# Patient Record
Sex: MALE | Race: WHITE | NOT HISPANIC OR LATINO | Employment: FULL TIME
[De-identification: names, ages, dates, MRNs, and addresses within clinical notes are randomized per-mention and may not be internally consistent; named-entity substitution may affect disease eponyms.]

---

## 2017-04-15 ENCOUNTER — RECORDS - HEALTHEAST (OUTPATIENT)
Dept: ADMINISTRATIVE | Facility: OTHER | Age: 27
End: 2017-04-15

## 2017-04-18 ENCOUNTER — OFFICE VISIT - HEALTHEAST (OUTPATIENT)
Dept: FAMILY MEDICINE | Facility: CLINIC | Age: 27
End: 2017-04-18

## 2017-04-18 ENCOUNTER — COMMUNICATION - HEALTHEAST (OUTPATIENT)
Dept: TELEHEALTH | Facility: CLINIC | Age: 27
End: 2017-04-18

## 2017-04-18 DIAGNOSIS — S39.012A BACK STRAIN: ICD-10-CM

## 2017-04-18 DIAGNOSIS — K21.9 ESOPHAGEAL REFLUX: ICD-10-CM

## 2017-04-18 DIAGNOSIS — Z72.0 TOBACCO USER: ICD-10-CM

## 2017-04-18 DIAGNOSIS — R94.31 ABNORMAL EKG: ICD-10-CM

## 2017-04-18 DIAGNOSIS — R07.89 ATYPICAL CHEST PAIN: ICD-10-CM

## 2017-04-19 LAB
ATRIAL RATE - MUSE: 92 BPM
CHOLEST SERPL-MCNC: 249 MG/DL
DIASTOLIC BLOOD PRESSURE - MUSE: NORMAL MMHG
FASTING STATUS PATIENT QL REPORTED: NO
HDLC SERPL-MCNC: 57 MG/DL
INTERPRETATION ECG - MUSE: NORMAL
LDLC SERPL CALC-MCNC: 168 MG/DL
P AXIS - MUSE: 79 DEGREES
PR INTERVAL - MUSE: 128 MS
QRS DURATION - MUSE: 84 MS
QT - MUSE: 342 MS
QTC - MUSE: 422 MS
R AXIS - MUSE: 54 DEGREES
SYSTOLIC BLOOD PRESSURE - MUSE: NORMAL MMHG
T AXIS - MUSE: 54 DEGREES
TRIGL SERPL-MCNC: 118 MG/DL
VENTRICULAR RATE- MUSE: 92 BPM

## 2017-04-20 ENCOUNTER — COMMUNICATION - HEALTHEAST (OUTPATIENT)
Dept: FAMILY MEDICINE | Facility: CLINIC | Age: 27
End: 2017-04-20

## 2017-04-21 ENCOUNTER — COMMUNICATION - HEALTHEAST (OUTPATIENT)
Dept: FAMILY MEDICINE | Facility: CLINIC | Age: 27
End: 2017-04-21

## 2017-05-12 ENCOUNTER — OFFICE VISIT - HEALTHEAST (OUTPATIENT)
Dept: FAMILY MEDICINE | Facility: CLINIC | Age: 27
End: 2017-05-12

## 2017-05-12 DIAGNOSIS — K21.9 GASTROESOPHAGEAL REFLUX DISEASE WITHOUT ESOPHAGITIS: ICD-10-CM

## 2017-05-12 DIAGNOSIS — E78.01 FAMILIAL HYPERCHOLESTEROLEMIA: ICD-10-CM

## 2017-05-12 DIAGNOSIS — R73.9 HYPERGLYCEMIA: ICD-10-CM

## 2017-05-12 DIAGNOSIS — Z72.0 TOBACCO USER: ICD-10-CM

## 2017-05-12 ASSESSMENT — MIFFLIN-ST. JEOR: SCORE: 1642.53

## 2017-05-24 ENCOUNTER — RECORDS - HEALTHEAST (OUTPATIENT)
Dept: ADMINISTRATIVE | Facility: OTHER | Age: 27
End: 2017-05-24

## 2018-02-26 ENCOUNTER — OFFICE VISIT - HEALTHEAST (OUTPATIENT)
Dept: FAMILY MEDICINE | Facility: CLINIC | Age: 28
End: 2018-02-26

## 2018-02-26 DIAGNOSIS — R82.998 SWEET URINE ODOR: ICD-10-CM

## 2018-02-26 DIAGNOSIS — Z00.00 ANNUAL PHYSICAL EXAM: ICD-10-CM

## 2018-02-26 DIAGNOSIS — R73.9 HYPERGLYCEMIA: ICD-10-CM

## 2018-02-26 DIAGNOSIS — E78.01 FAMILIAL HYPERCHOLESTEROLEMIA: ICD-10-CM

## 2018-02-26 LAB
ALBUMIN SERPL-MCNC: 3.9 G/DL (ref 3.5–5)
ALBUMIN UR-MCNC: NEGATIVE MG/DL
ALP SERPL-CCNC: 47 U/L (ref 45–120)
ALT SERPL W P-5'-P-CCNC: 31 U/L (ref 0–45)
APPEARANCE UR: CLEAR
AST SERPL W P-5'-P-CCNC: 15 U/L (ref 0–40)
BILIRUB DIRECT SERPL-MCNC: 0.2 MG/DL
BILIRUB SERPL-MCNC: 0.6 MG/DL (ref 0–1)
BILIRUB UR QL STRIP: NEGATIVE
CHOLEST SERPL-MCNC: 153 MG/DL
COLOR UR AUTO: YELLOW
FASTING STATUS PATIENT QL REPORTED: YES
GLUCOSE UR STRIP-MCNC: NEGATIVE MG/DL
HBA1C MFR BLD: 5.3 % (ref 3.5–6)
HDLC SERPL-MCNC: 33 MG/DL
HGB UR QL STRIP: NEGATIVE
KETONES UR STRIP-MCNC: NEGATIVE MG/DL
LDLC SERPL CALC-MCNC: 94 MG/DL
LEUKOCYTE ESTERASE UR QL STRIP: NEGATIVE
NITRATE UR QL: NEGATIVE
PH UR STRIP: 7 [PH] (ref 5–8)
PROT SERPL-MCNC: 6.8 G/DL (ref 6–8)
SP GR UR STRIP: 1.02 (ref 1–1.03)
TRIGL SERPL-MCNC: 130 MG/DL
UROBILINOGEN UR STRIP-ACNC: NORMAL

## 2018-02-26 ASSESSMENT — MIFFLIN-ST. JEOR: SCORE: 1613.5

## 2018-04-10 ENCOUNTER — OFFICE VISIT - HEALTHEAST (OUTPATIENT)
Dept: FAMILY MEDICINE | Facility: CLINIC | Age: 28
End: 2018-04-10

## 2018-04-10 DIAGNOSIS — R33.9 URINARY RETENTION: ICD-10-CM

## 2018-04-10 DIAGNOSIS — E78.01 FAMILIAL HYPERCHOLESTEROLEMIA: ICD-10-CM

## 2018-04-10 DIAGNOSIS — Z87.442 HISTORY OF NEPHROLITHIASIS: ICD-10-CM

## 2018-04-10 DIAGNOSIS — R39.15 URINARY URGENCY: ICD-10-CM

## 2018-04-10 LAB
ALBUMIN UR-MCNC: NEGATIVE MG/DL
APPEARANCE UR: CLEAR
BILIRUB UR QL STRIP: NEGATIVE
COLOR UR AUTO: YELLOW
GLUCOSE UR STRIP-MCNC: NEGATIVE MG/DL
HGB UR QL STRIP: ABNORMAL
KETONES UR STRIP-MCNC: NEGATIVE MG/DL
LEUKOCYTE ESTERASE UR QL STRIP: NEGATIVE
NITRATE UR QL: NEGATIVE
PH UR STRIP: 6 [PH] (ref 5–8)
SP GR UR STRIP: >=1.03 (ref 1–1.03)
UROBILINOGEN UR STRIP-ACNC: ABNORMAL

## 2018-06-20 ENCOUNTER — COMMUNICATION - HEALTHEAST (OUTPATIENT)
Dept: FAMILY MEDICINE | Facility: CLINIC | Age: 28
End: 2018-06-20

## 2018-10-15 ENCOUNTER — RECORDS - HEALTHEAST (OUTPATIENT)
Dept: ADMINISTRATIVE | Facility: OTHER | Age: 28
End: 2018-10-15

## 2019-07-23 ENCOUNTER — OFFICE VISIT - HEALTHEAST (OUTPATIENT)
Dept: FAMILY MEDICINE | Facility: CLINIC | Age: 29
End: 2019-07-23

## 2019-07-23 DIAGNOSIS — D48.5 NEOPLASM OF UNCERTAIN BEHAVIOR OF SKIN: ICD-10-CM

## 2019-07-23 DIAGNOSIS — R13.14 PHARYNGOESOPHAGEAL DYSPHAGIA: ICD-10-CM

## 2019-07-23 DIAGNOSIS — F41.9 ANXIETY: ICD-10-CM

## 2019-07-23 DIAGNOSIS — K21.9 GASTROESOPHAGEAL REFLUX DISEASE WITHOUT ESOPHAGITIS: ICD-10-CM

## 2019-07-23 DIAGNOSIS — Z00.00 ANNUAL PHYSICAL EXAM: ICD-10-CM

## 2019-07-23 DIAGNOSIS — E78.01 FAMILIAL HYPERCHOLESTEROLEMIA: ICD-10-CM

## 2019-07-23 LAB
ALBUMIN SERPL-MCNC: 4.6 G/DL (ref 3.5–5)
ALP SERPL-CCNC: 50 U/L (ref 45–120)
ALT SERPL W P-5'-P-CCNC: 27 U/L (ref 0–45)
ANION GAP SERPL CALCULATED.3IONS-SCNC: 6 MMOL/L (ref 5–18)
AST SERPL W P-5'-P-CCNC: 14 U/L (ref 0–40)
BILIRUB SERPL-MCNC: 0.9 MG/DL (ref 0–1)
BUN SERPL-MCNC: 12 MG/DL (ref 8–22)
CALCIUM SERPL-MCNC: 9.7 MG/DL (ref 8.5–10.5)
CHLORIDE BLD-SCNC: 102 MMOL/L (ref 98–107)
CHOLEST SERPL-MCNC: 208 MG/DL
CO2 SERPL-SCNC: 29 MMOL/L (ref 22–31)
CREAT SERPL-MCNC: 0.99 MG/DL (ref 0.7–1.3)
FASTING STATUS PATIENT QL REPORTED: YES
GFR SERPL CREATININE-BSD FRML MDRD: >60 ML/MIN/1.73M2
GLUCOSE BLD-MCNC: 98 MG/DL (ref 70–125)
HDLC SERPL-MCNC: 39 MG/DL
LDLC SERPL CALC-MCNC: 149 MG/DL
POTASSIUM BLD-SCNC: 4.2 MMOL/L (ref 3.5–5)
PROT SERPL-MCNC: 7.1 G/DL (ref 6–8)
SODIUM SERPL-SCNC: 137 MMOL/L (ref 136–145)
TRIGL SERPL-MCNC: 99 MG/DL

## 2019-07-23 ASSESSMENT — MIFFLIN-ST. JEOR: SCORE: 1712.72

## 2019-07-25 ENCOUNTER — COMMUNICATION - HEALTHEAST (OUTPATIENT)
Dept: FAMILY MEDICINE | Facility: CLINIC | Age: 29
End: 2019-07-25

## 2019-09-03 ENCOUNTER — RECORDS - HEALTHEAST (OUTPATIENT)
Dept: ADMINISTRATIVE | Facility: OTHER | Age: 29
End: 2019-09-03

## 2019-11-13 ENCOUNTER — RECORDS - HEALTHEAST (OUTPATIENT)
Dept: ADMINISTRATIVE | Facility: OTHER | Age: 29
End: 2019-11-13

## 2019-11-15 ENCOUNTER — RECORDS - HEALTHEAST (OUTPATIENT)
Dept: ADMINISTRATIVE | Facility: OTHER | Age: 29
End: 2019-11-15

## 2020-02-11 ENCOUNTER — OFFICE VISIT - HEALTHEAST (OUTPATIENT)
Dept: FAMILY MEDICINE | Facility: CLINIC | Age: 30
End: 2020-02-11

## 2020-02-11 DIAGNOSIS — L85.8 KERATOSIS PILARIS: ICD-10-CM

## 2020-02-11 DIAGNOSIS — E78.01 FAMILIAL HYPERCHOLESTEROLEMIA: ICD-10-CM

## 2020-02-11 DIAGNOSIS — K20.0 EOSINOPHILIC ESOPHAGITIS: ICD-10-CM

## 2020-02-11 DIAGNOSIS — N61.0 MASTITIS, RIGHT, ACUTE: ICD-10-CM

## 2020-03-02 ENCOUNTER — COMMUNICATION - HEALTHEAST (OUTPATIENT)
Dept: FAMILY MEDICINE | Facility: CLINIC | Age: 30
End: 2020-03-02

## 2020-03-02 ENCOUNTER — OFFICE VISIT - HEALTHEAST (OUTPATIENT)
Dept: FAMILY MEDICINE | Facility: CLINIC | Age: 30
End: 2020-03-02

## 2020-03-02 DIAGNOSIS — L02.92 BOIL: ICD-10-CM

## 2020-03-03 ENCOUNTER — RECORDS - HEALTHEAST (OUTPATIENT)
Dept: ADMINISTRATIVE | Facility: OTHER | Age: 30
End: 2020-03-03

## 2020-03-05 ENCOUNTER — COMMUNICATION - HEALTHEAST (OUTPATIENT)
Dept: FAMILY MEDICINE | Facility: CLINIC | Age: 30
End: 2020-03-05

## 2020-03-05 DIAGNOSIS — L02.92 BOIL: ICD-10-CM

## 2020-03-05 LAB — BACTERIA SPEC CULT: ABNORMAL

## 2020-03-17 ENCOUNTER — OFFICE VISIT - HEALTHEAST (OUTPATIENT)
Dept: FAMILY MEDICINE | Facility: CLINIC | Age: 30
End: 2020-03-17

## 2020-03-17 DIAGNOSIS — L02.92 BOIL: ICD-10-CM

## 2020-04-02 ENCOUNTER — RECORDS - HEALTHEAST (OUTPATIENT)
Dept: ADMINISTRATIVE | Facility: OTHER | Age: 30
End: 2020-04-02

## 2020-06-11 ENCOUNTER — OFFICE VISIT - HEALTHEAST (OUTPATIENT)
Dept: FAMILY MEDICINE | Facility: CLINIC | Age: 30
End: 2020-06-11

## 2020-06-11 DIAGNOSIS — F41.9 ANXIETY: ICD-10-CM

## 2020-06-11 DIAGNOSIS — F32.9 REACTIVE DEPRESSION: ICD-10-CM

## 2020-06-11 RX ORDER — HYDROXYZINE HYDROCHLORIDE 25 MG/1
25 TABLET, FILM COATED ORAL EVERY 4 HOURS PRN
Qty: 30 TABLET | Refills: 2 | Status: SHIPPED | OUTPATIENT
Start: 2020-06-11 | End: 2022-03-18

## 2020-06-11 ASSESSMENT — ANXIETY QUESTIONNAIRES
7. FEELING AFRAID AS IF SOMETHING AWFUL MIGHT HAPPEN: MORE THAN HALF THE DAYS
IF YOU CHECKED OFF ANY PROBLEMS ON THIS QUESTIONNAIRE, HOW DIFFICULT HAVE THESE PROBLEMS MADE IT FOR YOU TO DO YOUR WORK, TAKE CARE OF THINGS AT HOME, OR GET ALONG WITH OTHER PEOPLE: VERY DIFFICULT
3. WORRYING TOO MUCH ABOUT DIFFERENT THINGS: NEARLY EVERY DAY
2. NOT BEING ABLE TO STOP OR CONTROL WORRYING: MORE THAN HALF THE DAYS
4. TROUBLE RELAXING: MORE THAN HALF THE DAYS
3. WORRYING TOO MUCH ABOUT DIFFERENT THINGS: NEARLY EVERY DAY
6. BECOMING EASILY ANNOYED OR IRRITABLE: MORE THAN HALF THE DAYS
6. BECOMING EASILY ANNOYED OR IRRITABLE: MORE THAN HALF THE DAYS
1. FEELING NERVOUS, ANXIOUS, OR ON EDGE: MORE THAN HALF THE DAYS
4. TROUBLE RELAXING: MORE THAN HALF THE DAYS
5. BEING SO RESTLESS THAT IT IS HARD TO SIT STILL: SEVERAL DAYS
7. FEELING AFRAID AS IF SOMETHING AWFUL MIGHT HAPPEN: MORE THAN HALF THE DAYS
1. FEELING NERVOUS, ANXIOUS, OR ON EDGE: MORE THAN HALF THE DAYS
GAD7 TOTAL SCORE: 14
5. BEING SO RESTLESS THAT IT IS HARD TO SIT STILL: SEVERAL DAYS
GAD7 TOTAL SCORE: 14
2. NOT BEING ABLE TO STOP OR CONTROL WORRYING: MORE THAN HALF THE DAYS

## 2020-06-11 ASSESSMENT — PATIENT HEALTH QUESTIONNAIRE - PHQ9: SUM OF ALL RESPONSES TO PHQ QUESTIONS 1-9: 13

## 2020-06-24 ENCOUNTER — OFFICE VISIT - HEALTHEAST (OUTPATIENT)
Dept: BEHAVIORAL HEALTH | Facility: CLINIC | Age: 30
End: 2020-06-24

## 2020-06-24 DIAGNOSIS — F41.9 ANXIETY DISORDER, UNSPECIFIED TYPE: ICD-10-CM

## 2020-06-30 ENCOUNTER — OFFICE VISIT - HEALTHEAST (OUTPATIENT)
Dept: BEHAVIORAL HEALTH | Facility: CLINIC | Age: 30
End: 2020-06-30

## 2020-06-30 DIAGNOSIS — F41.1 GENERALIZED ANXIETY DISORDER: ICD-10-CM

## 2020-07-17 ENCOUNTER — OFFICE VISIT - HEALTHEAST (OUTPATIENT)
Dept: BEHAVIORAL HEALTH | Facility: CLINIC | Age: 30
End: 2020-07-17

## 2020-07-17 DIAGNOSIS — F41.1 GENERALIZED ANXIETY DISORDER: ICD-10-CM

## 2020-07-28 ENCOUNTER — COMMUNICATION - HEALTHEAST (OUTPATIENT)
Dept: BEHAVIORAL HEALTH | Facility: CLINIC | Age: 30
End: 2020-07-28

## 2020-08-06 ENCOUNTER — OFFICE VISIT - HEALTHEAST (OUTPATIENT)
Dept: BEHAVIORAL HEALTH | Facility: CLINIC | Age: 30
End: 2020-08-06

## 2020-08-06 DIAGNOSIS — F41.1 GENERALIZED ANXIETY DISORDER: ICD-10-CM

## 2020-08-11 ENCOUNTER — OFFICE VISIT - HEALTHEAST (OUTPATIENT)
Dept: BEHAVIORAL HEALTH | Facility: CLINIC | Age: 30
End: 2020-08-11

## 2020-08-11 DIAGNOSIS — F41.1 GENERALIZED ANXIETY DISORDER: ICD-10-CM

## 2020-08-12 ENCOUNTER — AMBULATORY - HEALTHEAST (OUTPATIENT)
Dept: BEHAVIORAL HEALTH | Facility: CLINIC | Age: 30
End: 2020-08-12

## 2020-08-20 ENCOUNTER — OFFICE VISIT - HEALTHEAST (OUTPATIENT)
Dept: BEHAVIORAL HEALTH | Facility: CLINIC | Age: 30
End: 2020-08-20

## 2020-08-20 DIAGNOSIS — F41.1 GENERALIZED ANXIETY DISORDER: ICD-10-CM

## 2020-09-03 ENCOUNTER — OFFICE VISIT - HEALTHEAST (OUTPATIENT)
Dept: BEHAVIORAL HEALTH | Facility: CLINIC | Age: 30
End: 2020-09-03

## 2020-09-03 DIAGNOSIS — F41.1 GENERALIZED ANXIETY DISORDER: ICD-10-CM

## 2020-09-14 ENCOUNTER — OFFICE VISIT - HEALTHEAST (OUTPATIENT)
Dept: BEHAVIORAL HEALTH | Facility: CLINIC | Age: 30
End: 2020-09-14

## 2020-09-14 DIAGNOSIS — F41.1 GENERALIZED ANXIETY DISORDER: ICD-10-CM

## 2020-09-28 ENCOUNTER — OFFICE VISIT - HEALTHEAST (OUTPATIENT)
Dept: BEHAVIORAL HEALTH | Facility: CLINIC | Age: 30
End: 2020-09-28

## 2020-09-28 DIAGNOSIS — F41.1 GENERALIZED ANXIETY DISORDER: ICD-10-CM

## 2020-10-12 ENCOUNTER — OFFICE VISIT - HEALTHEAST (OUTPATIENT)
Dept: BEHAVIORAL HEALTH | Facility: CLINIC | Age: 30
End: 2020-10-12

## 2020-10-12 DIAGNOSIS — F41.1 GENERALIZED ANXIETY DISORDER: ICD-10-CM

## 2020-11-09 ENCOUNTER — OFFICE VISIT - HEALTHEAST (OUTPATIENT)
Dept: BEHAVIORAL HEALTH | Facility: CLINIC | Age: 30
End: 2020-11-09

## 2020-11-09 DIAGNOSIS — F41.1 GENERALIZED ANXIETY DISORDER: ICD-10-CM

## 2020-11-23 ENCOUNTER — OFFICE VISIT - HEALTHEAST (OUTPATIENT)
Dept: BEHAVIORAL HEALTH | Facility: CLINIC | Age: 30
End: 2020-11-23

## 2020-11-23 DIAGNOSIS — F41.1 GENERALIZED ANXIETY DISORDER: ICD-10-CM

## 2020-12-23 ENCOUNTER — RECORDS - HEALTHEAST (OUTPATIENT)
Dept: ADMINISTRATIVE | Facility: OTHER | Age: 30
End: 2020-12-23

## 2021-01-07 ENCOUNTER — RECORDS - HEALTHEAST (OUTPATIENT)
Dept: ADMINISTRATIVE | Facility: OTHER | Age: 31
End: 2021-01-07

## 2021-02-01 ENCOUNTER — COMMUNICATION - HEALTHEAST (OUTPATIENT)
Dept: FAMILY MEDICINE | Facility: CLINIC | Age: 31
End: 2021-02-01

## 2021-02-01 ENCOUNTER — OFFICE VISIT - HEALTHEAST (OUTPATIENT)
Dept: FAMILY MEDICINE | Facility: CLINIC | Age: 31
End: 2021-02-01

## 2021-02-01 DIAGNOSIS — L20.84 INTRINSIC ECZEMA: ICD-10-CM

## 2021-02-01 DIAGNOSIS — F41.1 GAD (GENERALIZED ANXIETY DISORDER): ICD-10-CM

## 2021-02-01 DIAGNOSIS — J30.89 ENVIRONMENTAL AND SEASONAL ALLERGIES: ICD-10-CM

## 2021-02-01 ASSESSMENT — ANXIETY QUESTIONNAIRES
1. FEELING NERVOUS, ANXIOUS, OR ON EDGE: MORE THAN HALF THE DAYS
IF YOU CHECKED OFF ANY PROBLEMS ON THIS QUESTIONNAIRE, HOW DIFFICULT HAVE THESE PROBLEMS MADE IT FOR YOU TO DO YOUR WORK, TAKE CARE OF THINGS AT HOME, OR GET ALONG WITH OTHER PEOPLE: NOT DIFFICULT AT ALL
5. BEING SO RESTLESS THAT IT IS HARD TO SIT STILL: NOT AT ALL
2. NOT BEING ABLE TO STOP OR CONTROL WORRYING: SEVERAL DAYS
3. WORRYING TOO MUCH ABOUT DIFFERENT THINGS: MORE THAN HALF THE DAYS
GAD7 TOTAL SCORE: 5
7. FEELING AFRAID AS IF SOMETHING AWFUL MIGHT HAPPEN: NOT AT ALL
4. TROUBLE RELAXING: NOT AT ALL
6. BECOMING EASILY ANNOYED OR IRRITABLE: NOT AT ALL

## 2021-02-01 ASSESSMENT — PATIENT HEALTH QUESTIONNAIRE - PHQ9: SUM OF ALL RESPONSES TO PHQ QUESTIONS 1-9: 2

## 2021-02-22 ENCOUNTER — AMBULATORY - HEALTHEAST (OUTPATIENT)
Dept: BEHAVIORAL HEALTH | Facility: CLINIC | Age: 31
End: 2021-02-22

## 2021-02-22 ENCOUNTER — OFFICE VISIT - HEALTHEAST (OUTPATIENT)
Dept: BEHAVIORAL HEALTH | Facility: CLINIC | Age: 31
End: 2021-02-22

## 2021-02-22 DIAGNOSIS — F41.1 GENERALIZED ANXIETY DISORDER: ICD-10-CM

## 2021-02-22 ASSESSMENT — PATIENT HEALTH QUESTIONNAIRE - PHQ9: SUM OF ALL RESPONSES TO PHQ QUESTIONS 1-9: 6

## 2021-02-22 ASSESSMENT — ANXIETY QUESTIONNAIRES
1. FEELING NERVOUS, ANXIOUS, OR ON EDGE: SEVERAL DAYS
GAD7 TOTAL SCORE: 8
IF YOU CHECKED OFF ANY PROBLEMS ON THIS QUESTIONNAIRE, HOW DIFFICULT HAVE THESE PROBLEMS MADE IT FOR YOU TO DO YOUR WORK, TAKE CARE OF THINGS AT HOME, OR GET ALONG WITH OTHER PEOPLE: SOMEWHAT DIFFICULT
2. NOT BEING ABLE TO STOP OR CONTROL WORRYING: SEVERAL DAYS
7. FEELING AFRAID AS IF SOMETHING AWFUL MIGHT HAPPEN: SEVERAL DAYS
3. WORRYING TOO MUCH ABOUT DIFFERENT THINGS: MORE THAN HALF THE DAYS
6. BECOMING EASILY ANNOYED OR IRRITABLE: NEARLY EVERY DAY
4. TROUBLE RELAXING: NOT AT ALL
5. BEING SO RESTLESS THAT IT IS HARD TO SIT STILL: NOT AT ALL

## 2021-02-23 ENCOUNTER — OFFICE VISIT - HEALTHEAST (OUTPATIENT)
Dept: ALLERGY | Facility: CLINIC | Age: 31
End: 2021-02-23

## 2021-02-23 ENCOUNTER — SURGERY - HEALTHEAST (OUTPATIENT)
Dept: ALLERGY | Facility: CLINIC | Age: 31
End: 2021-02-23

## 2021-02-23 ENCOUNTER — RECORDS - HEALTHEAST (OUTPATIENT)
Dept: ADMINISTRATIVE | Facility: OTHER | Age: 31
End: 2021-02-23

## 2021-02-23 DIAGNOSIS — K20.0 EOSINOPHILIC ESOPHAGITIS: ICD-10-CM

## 2021-02-23 DIAGNOSIS — J30.89 ALLERGIC RHINITIS DUE TO DUST MITE: ICD-10-CM

## 2021-02-23 DIAGNOSIS — T78.40XD ALLERGIC REACTION, SUBSEQUENT ENCOUNTER: ICD-10-CM

## 2021-02-23 DIAGNOSIS — L30.8 OTHER ECZEMA: ICD-10-CM

## 2021-02-23 DIAGNOSIS — J30.81 ALLERGIC RHINITIS DUE TO CATS: ICD-10-CM

## 2021-02-23 ASSESSMENT — MIFFLIN-ST. JEOR: SCORE: 1722.36

## 2021-02-24 LAB
EGG WHITE IGE QN: 1.16 KU/L
SHRIMP IGE QN: <0.35 KU/L
SOYBEAN IGE QN: <0.35 KU/L
TOTAL IGE - HISTORICAL: 252 KU/L (ref 0–100)
WHEAT IGE QN: 0.57 KU/L

## 2021-02-25 ENCOUNTER — COMMUNICATION - HEALTHEAST (OUTPATIENT)
Dept: ALLERGY | Facility: CLINIC | Age: 31
End: 2021-02-25

## 2021-02-25 LAB
AVOCADO IGE QN: 0.43 KU/L
DEPRECATED MISC ALLERGEN IGE RAST QL: NORMAL

## 2021-02-26 ENCOUNTER — COMMUNICATION - HEALTHEAST (OUTPATIENT)
Dept: ALLERGY | Facility: CLINIC | Age: 31
End: 2021-02-26

## 2021-02-26 ENCOUNTER — COMMUNICATION - HEALTHEAST (OUTPATIENT)
Dept: ENDOCRINOLOGY | Facility: CLINIC | Age: 31
End: 2021-02-26

## 2021-03-22 ENCOUNTER — OFFICE VISIT - HEALTHEAST (OUTPATIENT)
Dept: BEHAVIORAL HEALTH | Facility: CLINIC | Age: 31
End: 2021-03-22

## 2021-03-22 DIAGNOSIS — F41.1 GENERALIZED ANXIETY DISORDER: ICD-10-CM

## 2021-04-13 ENCOUNTER — OFFICE VISIT - HEALTHEAST (OUTPATIENT)
Dept: BEHAVIORAL HEALTH | Facility: CLINIC | Age: 31
End: 2021-04-13

## 2021-04-13 DIAGNOSIS — F41.1 GENERALIZED ANXIETY DISORDER: ICD-10-CM

## 2021-05-07 ENCOUNTER — AMBULATORY - HEALTHEAST (OUTPATIENT)
Dept: NURSING | Facility: CLINIC | Age: 31
End: 2021-05-07

## 2021-05-27 ASSESSMENT — PATIENT HEALTH QUESTIONNAIRE - PHQ9
SUM OF ALL RESPONSES TO PHQ QUESTIONS 1-9: 6
SUM OF ALL RESPONSES TO PHQ QUESTIONS 1-9: 2
SUM OF ALL RESPONSES TO PHQ QUESTIONS 1-9: 13

## 2021-05-28 ENCOUNTER — AMBULATORY - HEALTHEAST (OUTPATIENT)
Dept: NURSING | Facility: CLINIC | Age: 31
End: 2021-05-28

## 2021-05-28 ENCOUNTER — RECORDS - HEALTHEAST (OUTPATIENT)
Dept: ADMINISTRATIVE | Facility: CLINIC | Age: 31
End: 2021-05-28

## 2021-05-28 ASSESSMENT — ANXIETY QUESTIONNAIRES
GAD7 TOTAL SCORE: 8
GAD7 TOTAL SCORE: 14
GAD7 TOTAL SCORE: 5
GAD7 TOTAL SCORE: 14

## 2021-05-29 ENCOUNTER — RECORDS - HEALTHEAST (OUTPATIENT)
Dept: ADMINISTRATIVE | Facility: CLINIC | Age: 31
End: 2021-05-29

## 2021-05-30 ENCOUNTER — RECORDS - HEALTHEAST (OUTPATIENT)
Dept: ADMINISTRATIVE | Facility: CLINIC | Age: 31
End: 2021-05-30

## 2021-05-30 VITALS — BODY MASS INDEX: 22.66 KG/M2 | WEIGHT: 157.9 LBS

## 2021-05-30 NOTE — PROGRESS NOTES
Assessment:      Healthy male exam.    1. Annual physical exam  Lipid [place]    Comprehensive Metabolic Panel   2. Gastroesophageal reflux disease without esophagitis  Ambulatory referral for Upper GI Endoscopy   3. Pharyngoesophageal dysphagia  Ambulatory referral for Upper GI Endoscopy   4. Neoplasm of uncertain behavior of skin  Ambulatory referral to Dermatology   5. Familial hypercholesterolemia     6. Anxiety          Plan:       Blood tests: See the above-mentioned lab work.  Patient's work health form will be filled out and faxed for him ASAP.  I will notify him of results by my chart and only call with grossly abnormal values.  Discussed healthy lifestyle modifications.  Follow up: Return in about 1 year (around 7/23/2020), (or sooner if symptoms worsen or fail to improve) for Annual physical.      I recommend he take Nexium daily in the morning.  We will address his most concerning issue today which is his GERD and dysphagia.  I would like him to see GI for an endoscopy.  We had a conversation concerning the possibility of esophageal stricture and risk for esophageal cancer and that a procedure to stretch the esophagus will likely be done.   I will also refer him to Dermatology for a skin check.  After addressing his GI issue I would like to see if his anxiety and other symptoms have improved.  If not, we will address the anxiety.        Subjective:     Dylan TURPIN Madaiemigdio is a 29 y.o. male who presents for an annual exam. The patient reports that there is not domestic violence in his life. He states that he has cleaned up his diet and eats very healthy.  He states he quit smoking a few years ago and was not a regular smoker.  He quit drinking beer and tries to limit his alcohol intake.  He is .  He has not concerns for STD testing.  He has not had an eye exam since he was a teenager and I recommend he do this.  He has a history of hyperlipidemia which he thinks is familial.  His LDL was 94 last  year.  He also describes a mole on his mid-back.  He has multiple concerns today.  His most pressing issue today is acid reflux.  Patient states he experiences increased heartburn and reflux at night especially when laying flat.  He has started taking Nexium almost daily.  Patient states he experiences coughing and shortness of breath with episodes of food getting stuck.  He describes one incident of going to the urgency room after a piece of steak got stuck in his throat and it caused him to vomit.  He also describes occasional palpitations with GERD episodes.  Denies chest pain or swelling in extremities.      Patient complains of bilateral wrist aching, right greater than left.  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  He describes a dull aching in the right wrist in the forearm after prolonged use.  He recently got a new mouse and pad that is more ergonomic and his discomfort has decreased.  He does describe some intermittent numbness and tingling during the day.  Denies waking in the morning with numb or tingling hand.  He denies weakness or loss of  strength.  He also describes swelling in the tops of his feet after he has been sitting for an extended period of time or after consuming alcohol.  This tends to occur when he is wearing dress shoes.  He does go to the gym regularly and does weight lighting and cardio activity.  He denies swelling in his feet with those activities.  He does not use an arch support in his dress shoes.       Patient has a history of ureteral stones and urinary retention.  He states he still has an occasional episode of urinary frequency and feels that he might not empty his bladder.  I referred him to Urology last fall when he had similar complaints and he did not follow through with this due to having a high deductible insurance.  He denies pain or burning with urination.  He states he will feel a tingling sensation during the middle  of a urine stream, during sexual activity, and while masturbating.  He denies sexual dysfunction.  He denies abdominal pain or flank pain.  He states he has no concern for STD testing.     He also states he has been experiencing increased anxiety over the last couple months.  He denies having a stressful job.  About once or twice a week he will feel like he just doesn't want to do something such as attend a birthday party or work event.  Coping strategies include going to the gym which is helpful.  He does think that some of his health concerns today are anxiety related.                Healthy Habits:   Regular Exercise: Yes  Sunscreen Use: Yes  Healthy Diet: Yes  Dental Visits Regularly: Yes  Seat Belt: Yes  Sexually active: Yes  Monthly Self Testicular Exams:  No  Hemoccults: N/A  Flex Sig: N/A  Colonoscopy: N/A  Lipid Profile: 2/26/18  Glucose Screen: 4/18/17  Prevention of Osteoporosis: N/A  Last Dexa: N/A  Guns at Home:  Yes  Guns Safety Locks:  Yes and Gun safe/class:  Yes      Immunization History   Administered Date(s) Administered     DT (pediatric) 06/14/2000     Hep B, Adult 06/14/2000, 08/16/2000, 04/14/2008     Hep B, historic 06/14/2000, 08/16/2000, 04/14/2008     MMR 11/13/1989, 06/14/2000     Td, adult adsorbed, PF 02/26/2018     Td,adult,historic,unspecified 04/14/2008     Tdap 04/14/2008     Immunization status: up to date and documented.    No exam data present     Current Outpatient Medications   Medication Sig Dispense Refill     esomeprazole (NEXIUM) 20 MG capsule Take one capsule daily in the morning as needed and one capsule at bedtime as needed.       fluticasone propionate (FLONASE) 50 mcg/actuation nasal spray Apply 1 spray into each nostril daily as needed for rhinitis.       No current facility-administered medications for this visit.      Past Medical History:   Diagnosis Date     Backache     Created by Conversion      Esophageal reflux     Created by Conversion      Ureteral stone  12/12/2015     Past Surgical History:   Procedure Laterality Date     none       Dust [other environmental allergy]; Percocet [oxycodone-acetaminophen]; and Pollen extracts  Family History   Problem Relation Age of Onset     Hypertension Father      Hyperlipidemia Father      Cancer Maternal Grandmother         lung from heavy smoking     Heart disease Maternal Grandfather      Urolithiasis Paternal Grandfather         recurrent x2     Heart disease Paternal Grandfather      Heart attack Paternal Grandfather      Diabetes Cousin      Social History     Socioeconomic History     Marital status: Single     Spouse name: Marina     Number of children: Not on file     Years of education: 14     Highest education level: Not on file   Occupational History     Occupation: [employer]     Employer: [employer]     Occupation: student     Comment: IT   Social Needs     Financial resource strain: Not on file     Food insecurity:     Worry: Not on file     Inability: Not on file     Transportation needs:     Medical: Not on file     Non-medical: Not on file   Tobacco Use     Smoking status: Former Smoker     Types: Cigarettes     Smokeless tobacco: Never Used   Substance and Sexual Activity     Alcohol use: Yes     Drug use: No     Sexual activity: Yes     Partners: Female   Lifestyle     Physical activity:     Days per week: Not on file     Minutes per session: Not on file     Stress: Not on file   Relationships     Social connections:     Talks on phone: Not on file     Gets together: Not on file     Attends Shinto service: Not on file     Active member of club or organization: Not on file     Attends meetings of clubs or organizations: Not on file     Relationship status: Not on file     Intimate partner violence:     Fear of current or ex partner: Not on file     Emotionally abused: Not on file     Physically abused: Not on file     Forced sexual activity: Not on file   Other Topics Concern     Not on file   Social History  "Narrative     Not on file       Review of Systems  Review of Systems   General ROS: negative for - chills or fever  Psychological ROS: positive for - anxiety  Ophthalmic ROS: negative for - decreased vision  ENT ROS: left ear wax--dec hearing  Allergy and Immunology ROS: negative  Hematological and Lymphatic ROS: negative  Endocrine ROS: negative  Breast ROS: negative  Respiratory ROS: positive for - cough, shortness of breath with GERD  Cardiovascular ROS: positive for - palpitations  Gastrointestinal ROS: no abdominal pain, change in bowel habits, or black or bloody stools  Genito-Urinary ROS: positive for - tingling with urination  Musculoskeletal ROS: positive for - tops of feet hurt  Neurological ROS: negative  Dermatological ROS: positive for mole on back,? color        Objective:     Vitals:    07/23/19 1119   BP: 124/84   Pulse: 91   SpO2: 97%   Weight: 170 lb 8 oz (77.3 kg)   Height: 5' 9.25\" (1.759 m)     Body mass index is 25 kg/m .    Physical  General appearance - alert, well appearing, and in no distress, normal appearing weight and well hydrated  Mental status - normal mood, behavior, speech, dress, motor activity, and thought processes  Eyes - pupils equal and reactive, extraocular eye movements intact  Ears - Right TM normal, ceruminosis noted in left ear  Nose - normal and patent, no erythema, discharge or polyps  Mouth - mucous membranes moist, pharynx normal without lesions  Neck - supple, no significant adenopathy, thyroid exam: thyroid is normal in size without nodules or tenderness  Lymphatics - no palpable lymphadenopathy, no hepatosplenomegaly  Chest - clear to auscultation, no wheezes, rales or rhonchi, symmetric air entry, unlabored respiratory effort  Heart - normal rate and regular rhythm, S1 and S2 normal, no murmurs noted  Abdomen - soft, nontender, nondistended, no masses or organomegaly, negative for CVA tenderness  Rectal - deferred, not clinically indicated  Back exam - full range " of motion, no tenderness, palpable spasm or pain on motion  Neurological - alert, oriented, normal speech, no focal findings or movement disorder noted, cranial nerves II through XII intact, DTR's normal and symmetric, normal muscle tone, no tremors, strength 5/5  Musculoskeletal - no joint tenderness, deformity or swelling, full range of motion without pain  Extremities - peripheral pulses normal, no pedal edema, no clubbing or cyanosis  Skin - warm and dry, no rashes.  Hyperpigmented elongated multilobular lesion with irregular borders on mid back, no bleeding or erythema    Physical Exam

## 2021-05-31 ENCOUNTER — RECORDS - HEALTHEAST (OUTPATIENT)
Dept: ADMINISTRATIVE | Facility: CLINIC | Age: 31
End: 2021-05-31

## 2021-05-31 VITALS — WEIGHT: 152.4 LBS | HEIGHT: 70 IN | BODY MASS INDEX: 21.82 KG/M2

## 2021-06-01 VITALS — BODY MASS INDEX: 20.9 KG/M2 | HEIGHT: 70 IN | WEIGHT: 146 LBS

## 2021-06-01 VITALS — BODY MASS INDEX: 21.77 KG/M2 | WEIGHT: 151.7 LBS

## 2021-06-02 VITALS — WEIGHT: 160 LBS | BODY MASS INDEX: 22.96 KG/M2

## 2021-06-03 ENCOUNTER — COMMUNICATION - HEALTHEAST (OUTPATIENT)
Dept: ALLERGY | Facility: CLINIC | Age: 31
End: 2021-06-03

## 2021-06-03 ENCOUNTER — RECORDS - HEALTHEAST (OUTPATIENT)
Dept: ALLERGY | Facility: CLINIC | Age: 31
End: 2021-06-03

## 2021-06-03 ENCOUNTER — RECORDS - HEALTHEAST (OUTPATIENT)
Dept: ADMINISTRATIVE | Facility: CLINIC | Age: 31
End: 2021-06-03

## 2021-06-03 VITALS — HEIGHT: 69 IN | BODY MASS INDEX: 25.25 KG/M2 | WEIGHT: 170.5 LBS

## 2021-06-04 ENCOUNTER — OFFICE VISIT - HEALTHEAST (OUTPATIENT)
Dept: ALLERGY | Facility: CLINIC | Age: 31
End: 2021-06-04

## 2021-06-04 VITALS
DIASTOLIC BLOOD PRESSURE: 86 MMHG | HEART RATE: 96 BPM | SYSTOLIC BLOOD PRESSURE: 130 MMHG | BODY MASS INDEX: 23.75 KG/M2 | WEIGHT: 162 LBS

## 2021-06-04 VITALS
SYSTOLIC BLOOD PRESSURE: 136 MMHG | BODY MASS INDEX: 24.27 KG/M2 | DIASTOLIC BLOOD PRESSURE: 88 MMHG | HEART RATE: 64 BPM | WEIGHT: 165.56 LBS

## 2021-06-04 DIAGNOSIS — L30.8 OTHER ECZEMA: ICD-10-CM

## 2021-06-04 RX ORDER — CETIRIZINE HYDROCHLORIDE 10 MG/1
10 TABLET ORAL DAILY
Status: SHIPPED | COMMUNITY
Start: 2021-06-04

## 2021-06-04 RX ORDER — LACTOBACILLUS RHAMNOSUS GG 15B CELL
1 CAPSULE, SPRINKLE ORAL 2 TIMES DAILY WITH MEALS
Status: SHIPPED | COMMUNITY
Start: 2021-06-04

## 2021-06-04 ASSESSMENT — MIFFLIN-ST. JEOR: SCORE: 1672.47

## 2021-06-05 VITALS — WEIGHT: 170 LBS | BODY MASS INDEX: 24.34 KG/M2 | HEART RATE: 81 BPM | HEIGHT: 70 IN | OXYGEN SATURATION: 97 %

## 2021-06-06 NOTE — PROGRESS NOTES
SUBJECTIVE: Dylan Neal is a 30 y.o. White or  male who presents today for evaluation of acutely developed right nipple pain and swelling.  Patient states he noticed pain and swelling of the right nipple when he was on his way home from [place] yesterday.  He stated the pain was quite severe and he was able to feel a lump.  He states the area to the right of the nipple was inflamed and swollen. Today he states the pain has decreased significantly.  He denies any discharge from the area, fever, chills, or red streaking from the area.  He denies swollen lymph nodes in the arm pit.  He does not recall any open cuts or breaks in the skin in that region.  He denies insect bites.  He was in a hot tub and hot springs while in [place].      Patient did have an upper GI done in November to investigate his complaints of dysphagia.  He was diagnosed with eosinophilic esophagitis and was started on Nexium 40 mg daily.  He was also told he had a hiatal hernia.  They recommended he return in 2 months for a follow up EGD.  He states he started taking the Nexium as prescribed and started to feel a lot better so he canceled the follow up he had scheduled and weaned himself off the Nexium.  He states he only takes it sporadically.  He states a friend of his had recommended he see an allergist if he was going to follow up with anyone.  He does have a history of allergies and has seen an allergist in the past.  He did see a Dermatologist last year and had multiple benign findings.  He will follow up with Derm as recommended.      He has a history of elevated cholesterol.  He has never been treated with statins.  He states he has cleaned up his diet, has not drank alcohol since December 2019, started doing yoga, and has lost some weight.  His last total cholesterol in July 2019 was 208 and his LDL was 149.  His cholesterol has fluctuated over the last few years.  In February 2018 his total cholesterol was 153  and LD was 94 and in April 2017 his total cholesterol was 249 and LDL was 168.  He does have a family history of hypercholesterolemia.          OBJECTIVE: /88 (Patient Site: Right Arm, Patient Position: Sitting, Cuff Size: Adult Regular)   Pulse 64   Wt 165 lb 9 oz (75.1 kg)   BMI 24.27 kg/m    General: Normal weight young male, appears anxious, in no acute distress  Heart: Regular rate and rhythm, no murmur  Lungs: Clear bilaterally, regular and unlabored respiratory effort  Breast: Small area of erythema and swelling at 9:00 o'clock position right nipple.  No discharge from nipple.  Small firm non-tender nodule palpated.  No abnormality left breast or nipple.    Abdomen: Soft, non-distended  Extremities: Warm and dry without edema  Skin: Keratosis pilaris visible on bilateral arms    ASSESSMENT & PLAN:     1. Mastitis, right, acute  - amoxicillin-clavulanate (AUGMENTIN) 875-125 mg per tablet; Take 1 tablet by mouth 2 (two) times a day for 7 days.  Dispense: 14 tablet; Refill: 0  - discussed importance of taking antibiotic as prescribed and finishing the entire course of antibiotics  - Patient will let me know if his symptoms do not improve or worsen after course of antibiotics.      2. Eosinophilic esophagitis  - I did recommend he restart the Nexium on a regular basis due to risk for esophageal erosion from the hiatal hernia.    - Ambulatory referral to Allergy  - esomeprazole (NEXIUM) 20 MG capsule; Take 1 capsule (20 mg total) by mouth daily before breakfast.  Dispense: 90 capsule; Refill: 1    3. Keratosis pilaris  - has been evaluated by Dermatology    4. Familial hypercholesterolemia  - patient will continue with healthy lifestyle changes, continue regular exercise, avoiding excessive alcohol, and healthy diet    Patient will let me know via MyChart in the antibiotics do not clear up the acute mastitis.  I think it is likely he had a small break in the skin and was exposed to bacteria via hot tub or  hot springs while on vacation.  He will return in July for a physical and will recheck his cholesterol at that time.     Patient Active Problem List   Diagnosis     Esophageal Reflux     Ureteral stone     Hyperlipidemia     Anxiety     Eosinophilic esophagitis       Current Outpatient Medications on File Prior to Visit   Medication Sig Dispense Refill     fluticasone propionate (FLONASE) 50 mcg/actuation nasal spray Apply 1 spray into each nostril daily as needed for rhinitis.       [DISCONTINUED] esomeprazole (NEXIUM) 20 MG capsule Take one capsule daily in the morning as needed and one capsule at bedtime as needed.       No current facility-administered medications on file prior to visit.

## 2021-06-06 NOTE — PROGRESS NOTES
Provider E-Visit time total (minutes): 25      Assessment:   There were no encounter diagnoses.     Plan:   No medications were ordered this encounter    There are no Patient Instructions on file for this visit.  Return for further follow up if needed. Call [phone]) or schedule an appointment [facility]    Subjective:   Dylan Neal is a 30 y.o. male who submitted an eVisit request for evaluation of his No chief complaint on file..  See the questionnaire and message section of encounter report for information related to history of present illness and review of systems.    The following portions of the patient's history were reviewed and updated as appropriate:  He does not have any pertinent problems on file.  He is allergic to dust [other environmental allergy]; percocet [oxycodone-acetaminophen]; and pollen extracts..     Objective:   No exam performed today, patient submitted as eVisit.    Freddy Hernandez, CNP

## 2021-06-08 NOTE — PROGRESS NOTES
"Dylan Neal is a 30 y.o. male who is being evaluated via a billable telephone visit.      The patient has been notified of following:     \"This telephone visit will be conducted via a call between you and your physician/provider. We have found that certain health care needs can be provided without the need for a physical exam.  This service lets us provide the care you need with a short phone conversation.  If a prescription is necessary we can send it directly to your pharmacy.  If lab work is needed we can place an order for that and you can then stop by our lab to have the test done at a later time.    Telephone visits are billed at different rates depending on your insurance coverage. During this emergency period, for some insurers they may be billed the same as an in-person visit.  Please reach out to your insurance provider with any questions.    If during the course of the call the physician/provider feels a telephone visit is not appropriate, you will not be charged for this service.\"    Patient has given verbal consent to a Telephone visit? Yes    What phone number would you like to be contacted at?     Patient would like to receive their AVS by AVS Preference: Nini.    Additional provider notes:     SUBJECTIVE: Dylan Neal is a 30 y.o. White or  male who presents today for a phone visit to discuss his anxiety and depression.  He had tried to do an ED visit but I asked him to make a telephone visit instead.  I have not seen him for quite some time.  He always had some dysthymia, but he also thought that he could pretty much control it especially anxiety wise.  Now he is noting that he is unable to focus because of this.  It is starting to affect his job.  Luckily he is working from home and can make his own schedule and the type of job he has.  He feels that since about 1 March he has had increasing symptoms because perhaps that is about the time that he started working from " home.  We discussed coping mechanisms like exercise, dieting etc. but he said the things have not been able to control his symptoms.  He was given a PHQ 9 today and scored 13.  He scored 14 on a MEERA 7.  He has not been on medication in the past.  We discussed his options.  He prefers not to be on any controlled substances.  We discussed that medication treatment along with therapy have the best statistics for improving his situation.  We discussed his options including use of beta-blocker to dampen his autonomic responses which seem to be significant when questioning him about them.  We also discussed using hydroxyzine on an as-needed basis for anxiety.  We discussed the fact that SSRIs and SNRIs have sexual side effects.  We discussed that Wellbutrin would be good for his depression but may affect and worsen his anxiety.  He prefers not to take anything on a regular daily basis at this point.  He denies serious insomnia.  He is using melatonin and nighttime tea remedies for sleep which seem to be working pretty well.  He is interested in talk therapy.    OBJECTIVE: There were no vitals taken for this visit.  General: Patient mood seems anxious but he still exhibits pretty good judgment    ASSESSMENT & PLAN:     1. Anxiety  MEERA 7 is 14 today.  Autonomic response is not helping and so I will start him on low-dose atenolol at a trial.  I will also do a trial of 25 mg hydroxyzine which she can use on an as-needed basis.  He denies any panic at this time or serious insomnia and would prefer to not have lorazepam at this time.  He will let me know if he changes his mind.  Open to doing therapy and so referral was made for therapist.  - AMB REFERRAL TO MENTAL HEALTH AND ADDICTION  - Adult (18+); Outpatient Treatment; Individual/Couples/Family/Group Therapy/Health Psychology; Co-located Psychotherapy; [facility]; Please call [phone] or speak with a specialty  wh...  - atenoloL (TENORMIN) 25 MG tablet;  Take at bedtime for anxiety  Dispense: 30 tablet; Refill: 2  - hydrOXYzine HCL (ATARAX) 25 MG tablet; Take 1 tablet (25 mg total) by mouth every 4 (four) hours as needed for anxiety.  Dispense: 30 tablet; Refill: 2    2. Reactive depression  Prefers not to be on medication that alters sexual function.  Prefers not to be on something on an continuing daily basis.  Would prefer to try therapist and anxiety medication first.  - AMB REFERRAL TO MENTAL HEALTH AND ADDICTION  - Adult (18+); Outpatient Treatment; Individual/Couples/Family/Group Therapy/Health Psychology; Co-located Psychotherapy; [facility]; Please call [phone] or speak with a specialty  wh...  - atenoloL (TENORMIN) 25 MG tablet; Take at bedtime for anxiety  Dispense: 30 tablet; Refill: 2  - hydrOXYzine HCL (ATARAX) 25 MG tablet; Take 1 tablet (25 mg total) by mouth every 4 (four) hours as needed for anxiety.  Dispense: 30 tablet; Refill: 2    The above 2 medications were given on a trial basis to see if he tolerates them and they work.  I have referred him to see a therapist.  If he is needing low-dose lorazepam for panic or sleep he is to let me know.  I would like him to follow-up with me in 4 to 6 weeks.    Patient Active Problem List   Diagnosis     Esophageal Reflux     Ureteral stone     Hyperlipidemia     Anxiety     Eosinophilic esophagitis     Reactive depression       Current Outpatient Medications on File Prior to Visit   Medication Sig Dispense Refill     esomeprazole (NEXIUM) 20 MG capsule Take 1 capsule (20 mg total) by mouth daily before breakfast. 90 capsule 1     fluticasone propionate (FLONASE) 50 mcg/actuation nasal spray Apply 1 spray into each nostril daily as needed for rhinitis.       No current facility-administered medications on file prior to visit.          Phone call duration:  28 minutes

## 2021-06-08 NOTE — PATIENT INSTRUCTIONS - HE
Patient Education     If you have further questions regarding your plan of care, please call your provider at Phone: [phone]    If you were prescribed medication, be sure to fill your prescription and follow medication directions    If you experience any medication side effects or minor reactions, please contact us at Phone: [phone]    If you or your family member have suicidal thoughts, contact 911 or go to your nearest Emergency Room    [facility]  Adult [phone]  Child: [phone] [facility]  Adult: [phone]  Child: [phone] [facility]   Adult/Child: [phone]   [facility] Crisis  Adult:  [phone]  Child:  [phone]     National Suicide Prevention Line:  1-569.250.2379    Urgent Care for Adult Mental Health    [address] [place] [address]   [id number]  [phone]      Mobile Crisis Team  [facility]    Adults, 18 and older  COPE- [phone]    Children, ages 17 and younger:  Child Crisis- [phone] Mobile Crisis Team  [facility]    24/7 Mobile Team and Crisis Line  [phone]     Text [place] to [phone] and you will be connected with a counselor who will help defuse the crisis and connect you to local resources.  Crisis Text Line is available 24 hours a day, 7 days a week.                         Based on the information provided, I would recommend you come in for an appointment to discuss these symptoms. Please schedule an appointment.    You will not be charged for this eVisit.

## 2021-06-09 NOTE — PROGRESS NOTES
"Mental Health tele Visit Note    Patient: Dylan Neal    : 1990 MRN: [id number]    Date: 2020  Start time: 4:30pm   Stop Time: 5:20pm   Session # Intake Session 2    The patient has been notified of the following:   \"We have found that certain health care needs can be provided without the need for a face to face visit.  This service lets us provide the care you need with a phone conversation.  I will have full access to your [facility] medical record during this entire phone call.   I will be taking notes for your medical record. Since this is like an office visit, we will bill your insurance company for this service.  There are potential benefits and risks of telephone visits (e.g. limits to patient confidentiality) that differ from in-person visits.?  Confidentiality still applies for telephone services, and nobody will record the visit.  It is important to be in a quiet, private space that is free of distractions (including cell phone or other devices) during the visit.?? If during the course of the call I believe a telephone visit is not appropriate, you will not be charged for this service\"  Consent has been obtained for this service by care team member: Yes, per verbal agreement   Session Type: Patient is participating in a telemedicine phone visit due to poor video quality     Chief Complaint   Patient presents with      Follow Up     Second follow-up intake visit     New symptoms or complaints: None reported    Functional Impairment:   Personal: 4  Family: 3  Work: 3  Social:3          ASSESSMENT: Current Emotional / Mental Status (status of significant symptoms):              Risk status (Self / Other harm or suicidal ideation)              Patient denies risks to personal safety              Patient denies current or recent suicidal ideation or behaviors.              Patient denies current or recent homicidal ideation or behaviors.              Patient denies current or recent self " injurious behavior or ideation.              Patient denies other safety concerns.              Patient denies risk factors              Patient denies changes to protective factors              Recommended that patient call 911 or go to the local ED should there be a change in any of these risk factors.                Attitude:                                   Cooperative               Orientation:                             x3              Speech                          Rate / Production:       Normal/ Responsive Emotional Normal                           Volume:                       Normal               Mood:                                      Anxious  Normal              Thought Content:                    Clear  Perservative               Thought Form:                        Coherent  Logical               Insight:                                     Good       Patient's impression of their current status:   Patient discusses the impact of anxiety symptoms at length today.  When there s a lot going on, it s really hard to focus.  Patient shares about patterns of rumination, looping thoughts,  thoughts that eat away at me,  worries about how people may view him, over-thinking, feeling stuck, feeling overstimulated and overwhelmed.   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      Therapist impression of patients current state:   Therapist's impression is that patient has good insight into the nature of his anxiety. He had difficulty navigating through his anxious feelings today and was reportedly distracted which prevented the therapist from asking questions necessary for the completion of the standard diagnostic assessment. The therapist focused on building rapport by providing support and unconditional positive regard. The therapist intends on completing the DA during the next encounter.     Type of  psychotherapeutic technique provided: Client centered, CBT and supportive counseling    Progress toward short term goals: Progress as expected, with patient openly discussing the nature of anxiety symptoms. Rapport is being easily established.     Review of long term goals:   Not done at today's visit  Long-term goals will be reviewed after standard DA is completed     Diagnosis:  1. Generalized anxiety disorder        Plan and Follow up:   The therapist and patient will conduct a 3rd visit with the intention of completing a standard diagnostic assessment.     Discharge Criteria/Planning: Patient will continue with follow-up until therapy can be discontinued without return of signs and symptoms.            I have reviewed the note as documented above.  This accurately captures the substance of my conversation with the patient.  As the provider I attest to compliance with applicable laws and regulations related to telemedicine.  Huyen Espino, NAUNSW

## 2021-06-09 NOTE — PROGRESS NOTES
"Mental Health tele Visit Note    Patient: Dylan Neal    : 1990 MRN: [id number]    Date: 2020  Start time: 5:15pm   Stop Time: 6:00pm   Session # Intake Session 1    The patient has been notified of the following:   \"We have found that certain health care needs can be provided without the need for a face to face visit.  This service lets us provide the care you need with a phone conversation.  I will have full access to your [facility] medical record during this entire phone call.   I will be taking notes for your medical record. Since this is like an office visit, we will bill your insurance company for this service.  There are potential benefits and risks of telephone visits (e.g. limits to patient confidentiality) that differ from in-person visits.?  Confidentiality still applies for telephone services, and nobody will record the visit.  It is important to be in a quiet, private space that is free of distractions (including cell phone or other devices) during the visit.?? If during the course of the call I believe a telephone visit is not appropriate, you will not be charged for this service\"  Consent has been obtained for this service by care team member: Yes, per verbal agreement   Session Type: Patient is participating in a telemedicine phone visit due to poor connection with [employer] today    Chief Complaint   Patient presents with     Intake     Initial visit to establish care     New symptoms or complaints: \"constant wave of anxiety\"    Functional Impairment:   Personal: 4  Family: 3.5 (immediate family)  Work: 2 (increased work load/responsibility after promotion last summer, 2019)  Social:2          ASSESSMENT: Current Emotional / Mental Status (status of significant symptoms):              Risk status (Self / Other harm or suicidal ideation)              Patient denies risks to personal safety              Patient denies current or recent suicidal ideation or behaviors.             " " Patient denies current or recent homicidal ideation or behaviors.              Patient denies current or recent self injurious behavior or ideation.              Patient denies other safety concerns.              Patient denies risk factors              Patient denies changes to protective factors              Recommended that patient call 911 or go to the local ED should there be a change in any of these risk factors.                Attitude:                                   Cooperative               Orientation:                             x3              Speech                          Rate / Production:       Normal/ Responsive Normal                           Volume:                       Normal               Mood:                                      Anxious  Normal              Thought Content:                    Clear               Thought Form:                        Coherent  Logical               Insight:                                     Good     Patient's impression of their current status:   The patient described reasons for engaging in therapy services during today's session. Patient reports, \"I've thought about participating in therapy for some time and finally asked my doctor about it.\" The patient describes a \"constant wave\" of anxiety that has been slowly increasing for the past few years. The patient reports that he has never been diagnosed with any mental health disorder and has never engaged in any specific mental health treatment. He has been prescribed psychotropic medications by his PCP in the past but denies much success with medication intervention. The patient has never engaged in therapy before so is \"not quite sure what to expect.\" Although, patient reports that he feels motivated to participate in therapy in order to better understand the nature of his anxiety symptoms and reduce impairments to functioning.     Therapist impression of patients current state:   Session began via " trish video accessed through [employer] but due to poor connectivity, the encounter was switched to telephone to ensure that proper rapport could be established.     Patient is a 30 year old  male. Patient was referred by Dr. Lopez to engage in individual psychotherapy to address symptoms of anxiety. The patient appeared cooperative and open-minded throughout the intake and easily engaged in dialogue. Therapist and patient reviewed consent and privacy policy. Patient reported understanding and provided verbal consent. The patient has not engaged in outpatient psychotherapy services in the community so there is no diagnostic assessment on file or available. The patient completed the following questionnaires prior to session: PHQ-9 (13) and MEERA-7 (14). Therapist would like patient to complete Mood disorder Questionnaire, WHODAS and CAGE during future visits.  No concerns about patient's safety or the safety of others per assessment today so CSSRS short version was completed.  Therapist and patient will further review patient's history during the next follow-up encounter in order to complete a standard diagnostic assessment. Goals for treatment will be established after the 2nd or 3rd follow-up session with this provider. Patient did not request psychiatry services at intake.     Type of psychotherapeutic technique provided: Client centered and Information gathering    Progress toward short term goals: Progress as expected, with patient openly engaging in discussion and building trust with the therapist    Review of long term goals:   Not done at today's visit    Diagnosis:  1. Anxiety disorder, unspecified type    R/O Generalized Anxiety Disorder  R/O Major Depression    Plan and Follow up:   Therapist and patient will conduct a second appointment in 1 week with the purpose of completing a standard diagnostic assessment.    Discharge Criteria/Planning: Patient will continue with follow-up until therapy can  be discontinued without return of signs and symptoms.            I have reviewed the note as documented above.  This accurately captures the substance of my conversation with the patient.  As the provider I attest to compliance with applicable laws and regulations related to telemedicine.  Performed and documented by SYL Ivey LICSW
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
## 2021-06-10 NOTE — PROGRESS NOTES
Mental Health Visit Note    Patient: Dylan Neal    : 1990 MRN: [id number]    2020    Start time: 4:30pm    Stop Time: 5:20pm   Session # 2    Session Type: Patient is presenting for an Individual session.    Dylan Neal is a 30 y.o. male is being seen today for    Chief Complaint   Patient presents with     MH Follow Up     Psychotherapy follow-up visit for anxiety   .     Telemedicine Visit: The patient's condition can be safely assessed and treated via synchronous audio and visual telemedicine encounter.      Reason for Telemedicine Visit: Patient has requested telehealth visit and Services only offered telehealth    Originating Site (Patient Location): Patient's home    Distant Site (Provider Location): Provider Remote Setting- Home Office    Consent:  The patient/guardian has verbally consented to: the potential risks and benefits of telemedicine (video visit) versus in person care; bill my insurance or make self-payment for services provided; and responsibility for payment of non-covered services.     Mode of Communication:  Video Conference via [employer]    As the provider I attest to compliance with applicable laws and regulations related to telemedicine.    Those present for this visit patient and therapist    Follow up in regards to ongoing symptom management of anxiety    New symptoms or complaints: None    Functional Impairment:   Personal: 2  Family: 1  Social: 1  Work: 2    Clinical assessment of mental status:   Dylan Neal presented on time.   He was oriented x3, open and cooperative, and dressed appropriately for this session and weather. His memory was Normal cognitive functioning .  His speech was  Within normal.  Language was within normal.  Concentration and focus is Within normal. Psychosis is not noted or reported. He reports his mood is Anxious.  Affect is congruent with speech and is Congruent w/content of speech.  Fund of knowledge is adequate. Insight  is adequate for therapy.    ASSESSMENT: Current Emotional / Mental Status (status of significant symptoms):              Risk status (Self / Other harm or suicidal ideation)              Patient denies risks to personal safety              Patient denies current or recent suicidal ideation or behaviors.              Patient denies current or recent homicidal ideation or behaviors.              Patient denies current or recent self injurious behavior or ideation.              Patient denies other safety concerns.              Patient denies risk factors impacting status              Patient denies changes to protective factors (; employed; educated)              Recommended that patient call 911 or go to the local ED should there be a change in any of these risk factors.    Patient's impression of their current status:   Patient reports experiencing some anxiety and using some deep breathing. Patient denied the presence of negative thoughts the past few days.   Patient reports noticing his breath over the past few days. Patient reports that he used to practice yoga and might be interested in participating again.   Patient reports that when he has too much going on, he tends to have more anxious thought patterns.     Therapist impression of patients current state: This 30 y.o. White or  male presenting with symptoms of anxiety.  Therapist provided unconditional positive regard, using a CBT framework to help patient gain insight into the connection between beliefs, thoughts, feelings and behaviors.  Therapist prompted patient to identify long-term goals.  Manage anxiety in order to reduce patterns of irritability, reduce negative thoughts of self, improve concentration and memory  -Prioritize self-care  -Start with yoga 2x week plus walking or lifting on the other days  -Improve sleep hygiene   -long term goal to manage anxiety symptoms without medication       Assessment tools used today include: CGI  and can be found documented in Doc Flowsheets.     Type of psychotherapeutic technique provided: Insight oriented and CBT    Progress toward short term goals:Progress as expected, with patient demonstrating good insight about the presence and impact of anxiety symptoms. He was receptive to therapist feedback and advice in regards to learning skills for managing anxiety symptoms   Patient reports practicing deep breathing techniques which was his homework task for the week.     Review of long term goals: Not done at today's visit .   Will create tx plan during next visit    Diagnosis:   1. Generalized anxiety disorder    no change    Plan and Follow-up:   Patient plans to continue taking the medication as prescribed.   Patient plans to follow up with therapy in a week    HW:   Create bed time routine       Discharge Criteria/Planning: Patient will continue with follow-up until therapy can be discontinued without return of signs and symptoms.    I have reviewed the note as documented above.  This accurately captures the substance of my conversation with the patient.    As the provider I attest to compliance with applicable laws and regulations related to telemedicine.  Performed and documented by (Huyen Espino, [facility]) 8/11/2020

## 2021-06-10 NOTE — PROGRESS NOTES
Mental Health Visit Note    Patient: Dylan Neal    : 1990 MRN: [id number]    2020    Start time: 4:30pm    Stop Time: 5:20pm   Session # 3    Session Type: Patient is presenting for an Individual session.    Dylan Neal is a 30 y.o. male is being seen today for    Chief Complaint   Patient presents with     MH Follow Up     Psychotherapy follow-up visit for anxiety   .     Telemedicine Visit: The patient's condition can be safely assessed and treated via synchronous audio and visual telemedicine encounter.      Reason for Telemedicine Visit: Patient has requested telehealth visit and Services only offered telehealth    Originating Site (Patient Location): Patient's home    Distant Site (Provider Location): Provider Remote Setting- Home Office    Consent:  The patient/guardian has verbally consented to: the potential risks and benefits of telemedicine (video visit) versus in person care; bill my insurance or make self-payment for services provided; and responsibility for payment of non-covered services.     Mode of Communication:  Video Conference via [employer];  Switched to telephone after 20 minutes due to poor connection - video froze and both parties were unable to join again    As the provider I attest to compliance with applicable laws and regulations related to telemedicine.    Those present for this visit patient and therapist    Follow up in regards to ongoing symptom management of anxiety    New symptoms or complaints: None    Functional Impairment:   Personal: 2  Family: 1  Social: 1  Work: 2    Clinical assessment of mental status:   Dylan Neal presented on time.   He was oriented x3, open and cooperative, and dressed appropriately for this session and weather. His memory was Normal cognitive functioning .  His speech was  Within normal.  Language was within normal.  Concentration and focus is Within normal. Psychosis is not noted or reported. He reports his mood  "is Anxious.  Affect is congruent with speech and is Congruent w/content of speech.  Fund of knowledge is adequate. Insight is adequate for therapy.    ASSESSMENT: Current Emotional / Mental Status (status of significant symptoms):              Risk status (Self / Other harm or suicidal ideation)              Patient denies risks to personal safety              Patient denies current or recent suicidal ideation or behaviors.              Patient denies current or recent homicidal ideation or behaviors.              Patient denies current or recent self injurious behavior or ideation.              Patient denies other safety concerns.              Patient denies risk factors impacting status              Patient denies changes to protective factors (; employed; educated)              Recommended that patient call 911 or go to the local ED should there be a change in any of these risk factors.    Patient's impression of their current status:   Patient reports he did yoga last Sunday and felt good afterwards. He noticed that his anxiety was worse today and he had to take the prescribed medication. He reports concerns about short-term memory loss. He is feeling forgetful. He reports that his anxiety continues to fluctuate day to day. He identifies various stressors. He does get down on himself for feeling anxious about \"small things.\"   Patient discusses how work expectations \"give me anxiety.\" Patient wants to give a good impression and is fearful of failing. He identifies some underlying insecurities and feelings of self-doubt.     Therapist impression of patients current state: This 30 y.o. White or  male presenting with symptoms of anxiety.  Therapist provided unconditional positive regard, using a CBT framework to help patient gain insight into the connection between beliefs, thoughts, feelings and behaviors.  Therapist helped patient navigate feelings of shame by re-framing internal dialogue to be " more accepting and non-judgmental.   Therapist established ground rule that patient must practice self-acceptance at all times.      Assessment tools used today include: CGI and can be found documented in Doc Flowsheets.     Type of psychotherapeutic technique provided: Insight oriented and CBT    Progress toward short term goals:Progress as expected, with patient demonstrating good insight about the presence and impact of anxiety symptoms. He was receptive to therapist feedback and advice in regards to learning skills for managing anxiety symptoms   Patient created bed time routine which was his homework task from last week.     Review of long term goals:  Not done at today's visit   Treatment Plan updated on 8/11/2020  Manage anxiety in order to reduce patterns of irritability, reduce negative thoughts of self, improve concentration and memory  -Prioritize self-care  -Start with yoga 2x week plus walking or lifting on the other days  -Improve sleep hygiene   -practice self-acceptance and positive self-talk  -long term goal to manage anxiety symptoms without medication      Diagnosis:   1. Generalized anxiety disorder    no change    Plan and Follow-up:   Patient plans to continue taking the medication as prescribed.   Patient plans to follow up with therapy in a week    HW:   Ground rule: practice self-acceptance at all times       Discharge Criteria/Planning: Patient will continue with follow-up until therapy can be discontinued without return of signs and symptoms.    I have reviewed the note as documented above.  This accurately captures the substance of my conversation with the patient.    As the provider I attest to compliance with applicable laws and regulations related to telemedicine.  Performed and documented by (Huyen Espino, [facility]) 8/20/2020

## 2021-06-10 NOTE — TELEPHONE ENCOUNTER
Sent [employer] video link via text but patient did not join call. Called and left patient voice message to call back in order to reschedule.

## 2021-06-10 NOTE — PROGRESS NOTES
"    SUBJECTIVE: Dylan Neal is a 27 y.o.  male who presents today  For follow-up of our last visit that was and urgent care follow-up for atypical chest pain. They had found an abnormal EKG which we rechecked and was unchanged from a few years earlier. At that time we checked his cholesterol and other labs. He was found to have a very high cholesterol for his age. He has a total cholesterol of 249, . He admits he was drinking often and too much at the time. He also was smoking. He has cut out the smoking and has cut way down on his drinking. He also says he was eating a horrible diet. He was eating our food and fast food often. Since that time he has really tried to eat much better. He notes that his reflux has improved with his diet. On his last labs he had an elevated glucose of 129. He denies a family history for diabetes and he is not overweight. In fact he's lost 5 pounds recently because he has started running again. He has decided to clean up his life because he and his fiancée are expecting to get  next year. They hope to do it in fall in [facility]. Things are going well for him except for he does have a stressful job at [employer]. They are contemplating a move to [place]. He would like to get a different job anyway and he would like to live where it's warm most of the year.    OBJECTIVE: /80  Pulse 78  Ht 5' 10\" (1.778 m)  Wt 152 lb 6.4 oz (69.1 kg)  BMI 21.87 kg/m2  General:  Healthy and fit appearing young gentleman in no acute distress  Heart:  Regular rate rhythm without murmur  Lungs:  Clear bilaterally  Abdomen:  soft  Extremities:  Warm, dry and without edema    ASSESSMENT & PLAN:    1. Familial hypercholesterolemia     2. Tobacco user     3. Hyperglycemia     4. Gastroesophageal reflux disease without esophagitis        We went over all his past lab results. We discussed that it is too soon to recheck these labs. I congratulated him on quitting smoking. " We discussed how much alcohol is acceptable. He is willing to follow up in about five months in order to have a recheck of his labs to see if there is improvement due to his change in lifestyle.    Patient Active Problem List   Diagnosis     Esophageal Reflux     Backache     Ureteral stone     Hyperlipidemia     Hyperglycemia       Current Outpatient Prescriptions on File Prior to Visit   Medication Sig Dispense Refill     cyclobenzaprine (FLEXERIL) 10 MG tablet   0     methylPREDNISolone (MEDROL DOSEPACK) 4 mg tablet   0     No current facility-administered medications on file prior to visit.

## 2021-06-10 NOTE — PROGRESS NOTES
Mental Health Visit Note    Patient: Dylan Neal    : 1990 MRN: [id number]    2020    Start time: 4:30pm    Stop Time: 5:20pm   Session # 1    Session Type: Patient is presenting for an Individual session.    Dylan Neal is a 30 y.o. male is being seen today for    Chief Complaint   Patient presents with     MH Follow Up     Psychotherapy follow-up visit for anxiety   .     Telemedicine Visit: The patient's condition can be safely assessed and treated via synchronous audio and visual telemedicine encounter.      Reason for Telemedicine Visit: Patient has requested telehealth visit and Services only offered telehealth    Originating Site (Patient Location): Patient's home    Distant Site (Provider Location): Provider Remote Setting- Home Office    Consent:  The patient/guardian has verbally consented to: the potential risks and benefits of telemedicine (video visit) versus in person care; bill my insurance or make self-payment for services provided; and responsibility for payment of non-covered services.     Mode of Communication:  Video Conference via [employer]    As the provider I attest to compliance with applicable laws and regulations related to telemedicine.    Those present for this visit patient and therapist    Follow up in regards to ongoing symptom management of anxiety    New symptoms or complaints: noticable feelings of irritation and frustration in addition to physical signs of stress and tension    Functional Impairment:   Personal: 2  Family: 1  Social: 1  Work: 2    Clinical assessment of mental status:   Dylan Neal presented on time.   He was oriented x3, open and cooperative, and dressed appropriately for this session and weather. His memory was Normal cognitive functioning .  His speech was  Within normal.  Language was within normal.  Concentration and focus is Within normal. Psychosis is not noted or reported. He reports his mood is Anxious.  Affect is  "congruent with speech and is Congruent w/content of speech.  Fund of knowledge is adequate. Insight is adequate for therapy.    ASSESSMENT: Current Emotional / Mental Status (status of significant symptoms):              Risk status (Self / Other harm or suicidal ideation)              Patient denies risks to personal safety              Patient denies current or recent suicidal ideation or behaviors.              Patient denies current or recent homicidal ideation or behaviors.              Patient denies current or recent self injurious behavior or ideation.              Patient denies other safety concerns.              Patient denies risk factors impacting status              Patient denies changes to protective factors (; employed; educated)              Recommended that patient call 911 or go to the local ED should there be a change in any of these risk factors.    Patient's impression of their current status:   Patient reports that he's had some good days and bad days. Some days, the anxiety doesn't \"creep up at all\" and other days the anxiety starts and it gets worse throughout the day. Some days patient will wake up with mild chest pain - this leads to feelings of frustration. He reports the presence of body pain and aches due to the anxiety which frustrates him - he develops \"feelings about the feelings.\" Patient identifies feelings of irritation and frustration occurring quite frequently. Patient discusses how this irritation grows into anger outbursts.  \"I'm wasting away - when I'm not going to work or working out impacts my body image.\"   Feeling of letting himself down - \"I feel like I'm really critical of myself.\" He identifies the presence of negative internal dialogue.     Therapist impression of patients current state: This 30 y.o. White or  male presents with symptoms of anxiety.  Therapist helped patient identify some underlying beliefs informing his automatic thoughts. "   Therapist guided patient through a breathing re-training exercise today to start addressing his response to anxiety symptoms.  Therapist provided unconditional positive regard, using a CBT framework to help patient gain insight into the connection between beliefs, thoughts, feelings and behaviors.    Assessment tools used today include: CGI and can be found documented in Doc Flowsheets.     Type of psychotherapeutic technique provided: Insight oriented and CBT    Progress toward short term goals:Progress as expected, with patient demonstrating good insight about the presence and impact of anxiety symptoms. He was receptive to therapist feedback and advice in regards to learning skills for managing anxiety symptoms    Review of long term goals: Not done at today's visit .   Will create tx plan during next visit    Diagnosis:   1. Generalized anxiety disorder    no change    Plan and Follow-up:   Patient plans to continue taking the medication as prescribed.   Patient plans to follow up with therapy in a week    HW:   Practice belly breathing      Discharge Criteria/Planning: Patient will continue with follow-up until therapy can be discontinued without return of signs and symptoms.    I have reviewed the note as documented above.  This accurately captures the substance of my conversation with the patient.    As the provider I attest to compliance with applicable laws and regulations related to telemedicine.  Performed and documented by (Huyen Espino, [facility]) 8/6/2020

## 2021-06-10 NOTE — PROGRESS NOTES
SUBJECTIVE: Dylan Neal is a 27 y.o.  male who presents today for follow-up ER visit on 4/15/17.  He has not been seen in our clinic and has not seen family medicine since 8/12/13.  He went into the emergency room at [facility] for evaluation of back pain but also complained of intermittent chest pain the morning he came in.  He had been dealing with the thoracic back pain for a month already.  For evaluation, lab work and EKG were done.  Lab work showed a mildly high anion gap at 14, elevated glucose at 115 but was otherwise normal.  CBC was normal and troponin was negative.  Chest x-ray was normal as well.  The EKG that was done however was slightly abnormal with the reading of probable septal infarct.  He was given cyclobenzaprine and Medrol Dosepak for his back pain and was told to be seen at his primary clinic for follow-up for a repeat EKG and possible outpatient echocardiogram.  He considers himself healthy.  He admits that he does have reflux disease and uses 2 weeks of over-the-counter Prilosec from time to time.  We discussed this may have been his chest pain issue.  He also admits to smoking cigarettes at times.  He does not feel he smokes much.  He does not even smoke every day.  We went through his past medical history, surgical, family, and social histories as well and they were documented into the chart.  He has been seen at the kidney stone clinic because he was having kidney stones.  He tells me he never followed up with them and never got results of stone analysis.  We went over the analysis of his stones which shows their composition to be calcium mostly oxalate.  We discussed that this means he should be staying away from Tums or other calcium-based antacids for his reflux.  He tells me he has been standing around at work a lot but that he has been going to the gym on a pretty regular basis.  He is uncertain whether the Medrol Dosepak helped but he does believe the  cyclobenzaprine is okay and he has been using NSAIDs.  He does not need anything stronger today.  He is feeling well otherwise today and has no other complaints.    Review of Systems -  General ROS: negative  Psychological ROS: negative  Ophthalmic ROS: negative  ENT ROS: negative  Allergy and Immunology ROS: negative  Hematological and Lymphatic ROS: negative  Endocrine ROS: negative  Respiratory ROS: negative  Cardiovascular ROS: positive for -atypical chest pain  Gastrointestinal ROS: positive for - heartburn  Genito-Urinary ROS: negative  Musculoskeletal ROS: positive for -thoracic back pain/strain  Neurological ROS: negative  Dermatological ROS: negative        OBJECTIVE: /80  Pulse 90  Wt 157 lb 14.4 oz (71.6 kg)  BMI 22.66 kg/m2  General: Healthy-appearing normal weight male in no acute distress  Heart: Regular rate and rhythm without murmur  Lungs: Clear bilaterally  Abdomen: Soft, nontender  Extremities: Warm, dry and without edema    EKG was done and was read as normal sinus rhythm with sinus arrhythmia, normal EKG, no change from 2015    I have counseled the patient for tobacco cessation and the follow up will occur  at the next visit.      ASSESSMENT & PLAN:    1. Back strain     2. Atypical chest pain  Comprehensive Metabolic Panel    Lipid Cascade RANDOM   3. Abnormal EKG  Electrocardiogram Perform and Read   4. Esophageal reflux       I reassured him I do not believe that he has anything cardiac in nature that needs to be followed up.  We did discuss that quitting smoking would be a liban idea.  He does not think he ate smokes enough to worry about it.  I will check the above-mentioned lab work because he had mildly elevated glucose and perhaps his cholesterol is high, he has no idea.  He would like to have them checked.  He is to continue with his cyclobenzaprine and NSAIDs for his back pain and he will continue exercising at the gym.  He can follow-up according to lab results and  need.    Patient Active Problem List   Diagnosis     Esophageal Reflux     Backache     Ureteral stone       No current outpatient prescriptions on file prior to visit.     No current facility-administered medications on file prior to visit.

## 2021-06-10 NOTE — PROGRESS NOTES
Outpatient Mental Health Treatment Plan    Name:  Dylan Neal  :  1990  MRN:  [id number]    Treatment Plan:  Initial Treatment Plan  Intake/initial treatment plan date:  Intake: 20  Benefit and risks and alternatives have been discussed: Yes  Is this treatment appropriate with minimal intrusion/restrictions: Yes  Estimated duration of treatment:  Approximately 10 sessions.  Anticipated frequency of services:  Every 1 weeks  Necessity for frequency: This frequency is needed to establish therapeutic goals and for continuity of care in order to monitor progress.  Necessity for treatment: To address cognitive, behavioral, and/or emotional barriers in order to work toward goals and to improve quality of life.    Session Type: Patient is presenting for an Individual session. via [email or web address] video conference due to COVID19    Plan:           ?   ? Anxiety    Goal:  Decrease average anxiety level from 3 to 2.   Strategies: ? [x]Learn and practice relaxation techniques and other coping strategies (e.g., thought stopping, reframing, meditation)     ? [x] Increase involvement in meaningful activities     ? [x] Discuss sleep hygiene     ? [x] Explore thoughts and expectations about self and others     ? [x] Identify and monitor triggers for panic/anxiety symptoms     ? [x] Implement physical activity routine (with physician approval)     ? [x] Consider introduction of bibliotherapy and/or videos     ? [x] Continue compliance with medical treatment plan (or explore barriers)   ?Degree to which this is a problem: 3  Degree to which goal is met: 1  Date of Review: 2020         Functional Impairment:  1=Not at all/Rarely  2=Some days  3=Most Days  4=Every Day    Personal : 2  Family : 1  Social : 1   Work/school : 2    Diagnosis:  (EXAMPLE of DSM V: Major depressive disorder, recurrent, moderate; Generalized Anxiety disorder; borderline personality per patient PHI; fibromyalgia, History of breast cancer in  remission; Problem with primary relationship.)   Generalized Anxiety Disorder        Strengths:  insightful, articulate, communicative, educated, resourceful, good support system  Limitations:  no history of mh treatment or sevices  Cultural Considerations: 30 year old  male - no significant cultural considerations for therapy    Persons responsible for this plan: Patient and Provider  Pt unable to sign due to virtual visit          Psychotherapist Signature           Patient Signature:              Guardian Signature             Provider: Performed and documented by SYL Ivey   Date:  8/12/2020

## 2021-06-11 NOTE — PROGRESS NOTES
Mental Health Visit Note    Patient: Dylan Neal    : 1990 MRN: [id number]    2020    Start time: 4:30pm    Stop Time: 5:20pm   Session # 6    Session Type: Patient is presenting for an Individual session.    Dylan Neal is a 30 y.o. male is being seen today for    Chief Complaint   Patient presents with     MH Follow Up     Psychotherapy follow-up visit for anxiety   .     Telemedicine Visit: The patient's condition can be safely assessed and treated via synchronous audio and visual telemedicine encounter.      Reason for Telemedicine Visit: Patient has requested telehealth visit and Services only offered telehealth    Originating Site (Patient Location): Patient's home    Distant Site (Provider Location): Provider Remote Setting- Home Office    Consent:  The patient/guardian has verbally consented to: the potential risks and benefits of telemedicine (video visit) versus in person care; bill my insurance or make self-payment for services provided; and responsibility for payment of non-covered services.     Mode of Communication:  Video Conference via [employer]  *disconnected after 20 minutes     As the provider I attest to compliance with applicable laws and regulations related to telemedicine.    Those present for this visit patient and therapist    Follow up in regards to ongoing symptom management of anxiety    New symptoms or complaints: None    Functional Impairment:   Personal: 2  Family: 1  Social: 1  Work: 2    Clinical assessment of mental status:   Dylan Neal presented on time.   He was oriented x3, open and cooperative, and dressed appropriately for this session and weather. His memory was Normal cognitive functioning .  His speech was  Within normal.  Language was within normal.  Concentration and focus is Within normal. Psychosis is not noted or reported. He reports his mood is Anxious.  Affect is congruent with speech and is Congruent w/content of speech.  Fund  "of knowledge is adequate. Insight is adequate for therapy.    ASSESSMENT: Current Emotional / Mental Status (status of significant symptoms):              Risk status (Self / Other harm or suicidal ideation)              Patient denies risks to personal safety              Patient denies current or recent suicidal ideation or behaviors.              Patient denies current or recent homicidal ideation or behaviors.              Patient denies current or recent self injurious behavior or ideation.              Patient denies other safety concerns.              Patient denies risk factors impacting status              Patient denies changes to protective factors (; employed; educated)              Recommended that patient call 911 or go to the local ED should there be a change in any of these risk factors.    Patient's impression of their current status:   Patient reports feeling slightly overwhelmed several days last week - \"it snowballs and gets worse.\" It's hard for him to identify specifically why he felt that way. He does feel concerned when he \"gets anxiety.\" He worries that he's not working out enough. He does not like the unknown and unexpected nature of anxiety - \"feeling anxious for no reason.\" He experiences some guilt about needing time alone when feeling down or \"crappy.\" He worries about his short-term memory and being forgetful.       Therapist impression of patients current state: This 30 y.o. White or  male presenting with symptoms of anxiety.  Therapist provided unconditional positive regard, following a CBT framework to help patient gain insight into the connection between beliefs, thoughts, feelings and behaviors. Therapist provided more psycho-education today about the nature of anxiety. Therapist reviewed grounding techniques and seeking opportunities to practice mindfulness skills.        Assessment tools used today include: CGI and can be found documented in Doc Flowsheets. "     Type of psychotherapeutic technique provided: Insight oriented and CBT    Progress toward short term goals:Progress as expected, with patient demonstrating good insight about the presence and impact of anxiety symptoms. He was receptive to therapist feedback and advice in regards to learning skills for managing anxiety symptoms. He is working on learned skills outside of session.     Review of long term goals:  Not done at today's visit   Treatment Plan updated on 8/11/2020  Manage anxiety in order to reduce patterns of irritability, reduce negative thoughts of self, improve concentration and memory  Strategies -  -Prioritize self-care  -Start with yoga 2x week plus walking or lifting on the other days  -Improve sleep hygiene   -practice self-acceptance and positive self-talk  -long term goal to manage anxiety symptoms without medication      Diagnosis:   1. Generalized anxiety disorder    no change    Plan and Follow-up:   Patient plans to continue taking the medication as prescribed.   Patient plans to follow up with therapy in 2 weeks    HW:   -practice grounding techniques  -maintain work-life balance   -practice self-acceptance at all times   -make notes in between sessions  -continue yoga practice   -advocate and articulate needs      Discharge Criteria/Planning: Patient will continue with follow-up until therapy can be discontinued without return of signs and symptoms.    I have reviewed the note as documented above.  This accurately captures the substance of my conversation with the patient.    As the provider I attest to compliance with applicable laws and regulations related to telemedicine.  Performed and documented by (Huyen Espino, [facility]) 9/28/2020

## 2021-06-11 NOTE — PROGRESS NOTES
Mental Health Visit Note    Patient: Dylan Neal    : 1990 MRN: [id number]    9/3/2020    Start time: 4:30pm    Stop Time: 5:20pm   Session # 4    Session Type: Patient is presenting for an Individual session.    Dylan Neal is a 30 y.o. male is being seen today for    Chief Complaint   Patient presents with     MH Follow Up     Psychotherapy follow-up visit for anxiety   .     Telemedicine Visit: The patient's condition can be safely assessed and treated via synchronous audio and visual telemedicine encounter.      Reason for Telemedicine Visit: Patient has requested telehealth visit and Services only offered telehealth    Originating Site (Patient Location): Patient's home    Distant Site (Provider Location): Provider Remote Setting- Home Office    Consent:  The patient/guardian has verbally consented to: the potential risks and benefits of telemedicine (video visit) versus in person care; bill my insurance or make self-payment for services provided; and responsibility for payment of non-covered services.     Mode of Communication:  Video Conference via [employer]    As the provider I attest to compliance with applicable laws and regulations related to telemedicine.    Those present for this visit patient and therapist    Follow up in regards to ongoing symptom management of anxiety    New symptoms or complaints: None    Functional Impairment:   Personal: 2  Family: 1  Social: 1  Work: 2    Clinical assessment of mental status:   Dylan Neal presented on time.   He was oriented x3, open and cooperative, and dressed appropriately for this session and weather. His memory was Normal cognitive functioning .  His speech was  Within normal.  Language was within normal.  Concentration and focus is Within normal. Psychosis is not noted or reported. He reports his mood is Anxious.  Affect is congruent with speech and is Congruent w/content of speech.  Fund of knowledge is adequate. Insight  "is adequate for therapy.    ASSESSMENT: Current Emotional / Mental Status (status of significant symptoms):              Risk status (Self / Other harm or suicidal ideation)              Patient denies risks to personal safety              Patient denies current or recent suicidal ideation or behaviors.              Patient denies current or recent homicidal ideation or behaviors.              Patient denies current or recent self injurious behavior or ideation.              Patient denies other safety concerns.              Patient denies risk factors impacting status              Patient denies changes to protective factors (; employed; educated)              Recommended that patient call 911 or go to the local ED should there be a change in any of these risk factors.    Patient's impression of their current status:   Patient reports \"feeling really good.\" He did yoga over lunch today. He reports that his week has been really busy. He reports that his anxiety has been ebbing and flowing (\"like a roller coaster\").  He anticipates that his anxiety will get worse again. \"I feel good now but at some point it might get worse and I do feel afraid of this.\"  He agrees that he's not as frustrated with himself anymore. He's not experiencing as much negative internal dialogue. He is beginning to better understand the nature of his anxiety. He is finding ways to replenish his energy and find down time.     Therapist impression of patients current state: This 30 y.o. White or  male presenting with symptoms of anxiety.  Therapist provided unconditional positive regard, following a CBT framework to help patient gain insight into the connection between beliefs, thoughts, feelings and behaviors. Therapist reviewed ground rule that patient must practice self-acceptance at all times. Therapist and patient reviewed other coping strategies for continued practice.      Assessment tools used today include: CGI and can " "be found documented in Doc Flowsheets.     Type of psychotherapeutic technique provided: Insight oriented and CBT    Progress toward short term goals:Progress as expected, with patient demonstrating good insight about the presence and impact of anxiety symptoms. He was receptive to therapist feedback and advice in regards to learning skills for managing anxiety symptoms    *he hasn't had any \"bad anxiety\" since the end of June     Review of long term goals:  Not done at today's visit   Treatment Plan updated on 8/11/2020  Manage anxiety in order to reduce patterns of irritability, reduce negative thoughts of self, improve concentration and memory  -Prioritize self-care  -Start with yoga 2x week plus walking or lifting on the other days  -Improve sleep hygiene   -practice self-acceptance and positive self-talk  -long term goal to manage anxiety symptoms without medication      Diagnosis:   1. Generalized anxiety disorder    no change    Plan and Follow-up:   Patient plans to continue taking the medication as prescribed.   Patient plans to follow up with therapy in 2 weeks    HW:   Ground rule: practice self-acceptance at all times   -make notes in between  -continue yoga practice     Discharge Criteria/Planning: Patient will continue with follow-up until therapy can be discontinued without return of signs and symptoms.    I have reviewed the note as documented above.  This accurately captures the substance of my conversation with the patient.    As the provider I attest to compliance with applicable laws and regulations related to telemedicine.  Performed and documented by (Huyen Espino, [facility]) 9/3/2020  "

## 2021-06-11 NOTE — PROGRESS NOTES
Mental Health Visit Note    Patient: Dylan Neal    : 1990 MRN: [id number]    2020    Start time: 4:31pm    Stop Time: 5:20pm   Session # 5    Session Type: Patient is presenting for an Individual session.    Dylan Neal is a 30 y.o. male is being seen today for    Chief Complaint   Patient presents with     MH Follow Up     Psychotherapy follow-up visit for anxiety   .     Telemedicine Visit: The patient's condition can be safely assessed and treated via synchronous audio and visual telemedicine encounter.      Reason for Telemedicine Visit: Patient has requested telehealth visit and Services only offered telehealth    Originating Site (Patient Location): Patient's home    Distant Site (Provider Location): Provider Remote Setting- Home Office    Consent:  The patient/guardian has verbally consented to: the potential risks and benefits of telemedicine (video visit) versus in person care; bill my insurance or make self-payment for services provided; and responsibility for payment of non-covered services.     Mode of Communication:  Video Conference via [employer]    As the provider I attest to compliance with applicable laws and regulations related to telemedicine.    Those present for this visit patient and therapist    Follow up in regards to ongoing symptom management of anxiety    New symptoms or complaints: None    Functional Impairment:   Personal: 2  Family: 1  Social: 1  Work: 2    Clinical assessment of mental status:   Dylan Neal presented on time.   He was oriented x3, open and cooperative, and dressed appropriately for this session and weather. His memory was Normal cognitive functioning .  His speech was  Within normal.  Language was within normal.  Concentration and focus is Within normal. Psychosis is not noted or reported. He reports his mood is Anxious.  Affect is congruent with speech and is Congruent w/content of speech.  Fund of knowledge is adequate. Insight  is adequate for therapy.    ASSESSMENT: Current Emotional / Mental Status (status of significant symptoms):              Risk status (Self / Other harm or suicidal ideation)              Patient denies risks to personal safety              Patient denies current or recent suicidal ideation or behaviors.              Patient denies current or recent homicidal ideation or behaviors.              Patient denies current or recent self injurious behavior or ideation.              Patient denies other safety concerns.              Patient denies risk factors impacting status              Patient denies changes to protective factors (; employed; educated)              Recommended that patient call 911 or go to the local ED should there be a change in any of these risk factors.    Patient's impression of their current status:   Patient reports that his anxiety has been manageable but he had one day last week where he needed to take his medicine due to anxiety. He expresses some fears about chest pains.  The chest pains are more frequent when his anxiety is higher. He describes the difference between a dull ache and acid reflux.  He reports feeling really restless last night and having trouble falling asleep.    He recalls being vulnerable and sharing about his anxiety experience with his cousin. He has not yet shared with his parents - he's not very open with them about his mental health. He also shares that they just aren't very close.   He reflects upon his family system today. He expresses some resentment now that he is more removed from the situation. He agrees that there is tension that has been unresolved.     Therapist impression of patients current state: This 30 y.o. White or  male presenting with symptoms of anxiety.  Therapist provided unconditional positive regard, following a CBT framework to help patient gain insight into the connection between beliefs, thoughts, feelings and behaviors.  "Therapist reviewed ground rule that patient must practice self-acceptance at all times. Therapist and patient discussed opportunities for patient to be vulnerable and open with members of his support network in addition to in session.      Assessment tools used today include: CGI and can be found documented in Doc Flowsheets.     Type of psychotherapeutic technique provided: Insight oriented and CBT    Progress toward short term goals:Progress as expected, with patient demonstrating good insight about the presence and impact of anxiety symptoms. He was receptive to therapist feedback and advice in regards to learning skills for managing anxiety symptoms. He is appearing more vulnerable during each session allowing for greater processing and introspective work.    *he hasn't had any \"bad anxiety\" since the end of June     Review of long term goals:  Not done at today's visit   Treatment Plan updated on 8/11/2020  Manage anxiety in order to reduce patterns of irritability, reduce negative thoughts of self, improve concentration and memory  -Prioritize self-care  -Start with yoga 2x week plus walking or lifting on the other days  -Improve sleep hygiene   -practice self-acceptance and positive self-talk  -long term goal to manage anxiety symptoms without medication      Diagnosis:   1. Generalized anxiety disorder    no change    Plan and Follow-up:   Patient plans to continue taking the medication as prescribed.   Patient plans to follow up with therapy in 2 weeks    HW:   -talk to sister and explore options to reduce tension within family system  Ground rule: practice self-acceptance at all times   -make notes in between  -continue yoga practice     Discharge Criteria/Planning: Patient will continue with follow-up until therapy can be discontinued without return of signs and symptoms.    I have reviewed the note as documented above.  This accurately captures the substance of my conversation with the patient.    As the " provider I attest to compliance with applicable laws and regulations related to telemedicine.  Performed and documented by ([facility], [facility]) 9/14/2020

## 2021-06-12 NOTE — PROGRESS NOTES
Mental Health Visit Note    Patient: Dylan Neal    : 1990 MRN: [id number]    2020    Start time: 4:00pm    Stop Time: 4:50pm   Session # 8    Session Type: Patient is presenting for an Individual session.    Dylan Neal is a 30 y.o. male is being seen today for    Chief Complaint   Patient presents with     MH Follow Up     Psychotherapy follow-up visit for anxiety   .     Telemedicine Visit: The patient's condition can be safely assessed and treated via synchronous audio and visual telemedicine encounter.      Reason for Telemedicine Visit: Patient has requested telehealth visit and Services only offered telehealth    Originating Site (Patient Location): Patient's home    Distant Site (Provider Location): Provider Remote Setting- Home Office    Consent:  The patient/guardian has verbally consented to: the potential risks and benefits of telemedicine (video visit) versus in person care; bill my insurance or make self-payment for services provided; and responsibility for payment of non-covered services.     Mode of Communication:  Video Conference via [employer]    As the provider I attest to compliance with applicable laws and regulations related to telemedicine.    Those present for this visit include patient and therapist    Follow up in regards to ongoing symptom management of anxiety    New symptoms or complaints: None    Functional Impairment:   Personal: 2  Family: 1  Social: 1  Work: 2    Clinical assessment of mental status:   Dylan Neal presented on time.   He was oriented x3, open and cooperative, and dressed appropriately for this session and weather. His memory was Normal cognitive functioning .  His speech was  Within normal.  Language was within normal.  Concentration and focus is Within normal. Psychosis is not noted or reported. He reports his mood is Anxious.  Affect is congruent with speech and is Congruent w/content of speech.  Fund of knowledge is adequate.  "Insight is adequate for therapy.    ASSESSMENT: Current Emotional / Mental Status (status of significant symptoms):              Risk status (Self / Other harm or suicidal ideation)              Patient denies risks to personal safety              Patient denies current or recent suicidal ideation or behaviors.              Patient denies current or recent homicidal ideation or behaviors.              Patient denies current or recent self injurious behavior or ideation.              Patient denies other safety concerns.              Patient denies risk factors impacting status              Patient denies changes to protective factors (; employed; educated)              Recommended that patient call 911 or go to the local ED should there be a change in any of these risk factors.    Patient's impression of their current status:   Patient discusses the presence of anxiety symptoms day-to-day. He shares feeling \"scattered\" with a \"brain fog\" and \"heart palpitations.\" He shares that \"when I get busy I feel overwhelmed\" particularly with work.  He does report having low energy and having trouble finding time to exercise.    He reports that he has been sleeping well. He has been taking his medications as prescribed.      Therapist impression of patients current state: This 30 y.o. White or  male presenting with symptoms of anxiety.  Patient was communicative and cooperative throughout the visit. He easily engaged in dialogue about presenting symptoms. He was receptive to therapist feedback and recommendations, adequately participating in the therapeutic process.       Assessment tools used today include: CGI and can be found documented in Doc Flowsheets.     Type of psychotherapeutic technique provided: Insight oriented and CBT   Therapist recommended that patient practice mindfulness skills throughout the day to manage anxiety symptoms.  Therapist provided unconditional positive regard and support while " "helping patient make connections between thoughts, feelings and behaviors.     Progress toward short term goals:Progress as expected, with patient making steps towards managing anxiety symptoms using learned techniques and skills.    Review of long term goals:  Not done at today's visit   Treatment Plan updated on 8/11/2020  Manage anxiety in order to reduce patterns of irritability, reduce negative thoughts of self, improve concentration and memory    Diagnosis:   1. Generalized anxiety disorder    no change    Plan and Follow-up:   Patient plans to continue taking medications as prescribed.   Patient plans to follow up with therapy in 2 weeks  Patient plans to reduce caffeine intake and find ways to move his body more throughout the work day  \"mindfulness breaks throughout the day\"    Skills for ongoing practice:  -practice grounding techniques  -maintain work-life balance   -practice self-acceptance at all times   -make notes in between sessions  -continue yoga practice   -advocate and articulate needs      Discharge Criteria/Planning: Patient will continue with follow-up until therapy can be discontinued without return of signs and symptoms.    I have reviewed the note as documented above.  This accurately captures the substance of my conversation with the patient.    As the provider I attest to compliance with applicable laws and regulations related to telemedicine.  Performed and documented by (Huyen Espino, [facility]) 11/9/2020  "

## 2021-06-12 NOTE — PROGRESS NOTES
Mental Health Visit Note    Patient: Dylan Neal    : 1990 MRN: [id number]    10/12/2020    Start time: 4:00pm    Stop Time: 4:50pm   Session # 7    Session Type: Patient is presenting for an Individual session.    Dylan Neal is a 30 y.o. male is being seen today for    Chief Complaint   Patient presents with     MH Follow Up     Psychotherapy follow-up visit for anxiety   .     Telemedicine Visit: The patient's condition can be safely assessed and treated via synchronous audio and visual telemedicine encounter.      Reason for Telemedicine Visit: Patient has requested telehealth visit and Services only offered telehealth    Originating Site (Patient Location): Patient's home    Distant Site (Provider Location): Provider Remote Setting- Home Office    Consent:  The patient/guardian has verbally consented to: the potential risks and benefits of telemedicine (video visit) versus in person care; bill my insurance or make self-payment for services provided; and responsibility for payment of non-covered services.     Mode of Communication:  Video Conference via [employer]    As the provider I attest to compliance with applicable laws and regulations related to telemedicine.    Those present for this visit include patient and therapist    Follow up in regards to ongoing symptom management of anxiety    New symptoms or complaints: None    Functional Impairment:   Personal: 2  Family: 1  Social: 1  Work: 2    Clinical assessment of mental status:   Dylan Neal presented on time.   He was oriented x3, open and cooperative, and dressed appropriately for this session and weather. His memory was Normal cognitive functioning .  His speech was  Within normal.  Language was within normal.  Concentration and focus is Within normal. Psychosis is not noted or reported. He reports his mood is Anxious.  Affect is congruent with speech and is Congruent w/content of speech.  Fund of knowledge is  "adequate. Insight is adequate for therapy.    ASSESSMENT: Current Emotional / Mental Status (status of significant symptoms):              Risk status (Self / Other harm or suicidal ideation)              Patient denies risks to personal safety              Patient denies current or recent suicidal ideation or behaviors.              Patient denies current or recent homicidal ideation or behaviors.              Patient denies current or recent self injurious behavior or ideation.              Patient denies other safety concerns.              Patient denies risk factors impacting status              Patient denies changes to protective factors (; employed; educated)              Recommended that patient call 911 or go to the local ED should there be a change in any of these risk factors.    Patient's impression of their current status:   Patient reports that his sleep has been \"pretty good\" with some restless nights. He no longer identifies sleep as a problem.  He reports not being able to workout as much lately. He spent time with his family over the wknd. He reflects upon some resentments with his parents and sister from the past. He discusses family dynamics such as not feeling comfortable openly sharing about himself. He wasn't able to identify an example of how his parents show him love and support.   He reports that his anxiety still gets out of control at points and his anxiety is up and down - he shares, \"I would like to have a stable feeling personally.\"      Therapist impression of patients current state: This 30 y.o. White or  male presenting with symptoms of anxiety.  Therapist provided unconditional positive regard, following a CBT framework to help patient gain insight into the connection between beliefs, thoughts, feelings and behaviors.  Therapist and patient explored options for better managing his anxiety symptoms. Therapist will help patient continue to take steps towards " "self-acceptance.      Assessment tools used today include: CGI and can be found documented in Doc Flowsheets.     Type of psychotherapeutic technique provided: Insight oriented and CBT    Progress toward short term goals:Progress as expected, with patient demonstrating good insight about the presence and impact of anxiety symptoms. He was receptive to therapist feedback and advice in regards to learning skills for managing anxiety symptoms. He is working on learned skills outside of session.     Review of long term goals:  Not done at today's visit   Treatment Plan updated on 8/11/2020  Manage anxiety in order to reduce patterns of irritability, reduce negative thoughts of self, improve concentration and memory  Strategies -  -Prioritize self-care  -Start with yoga 2x week plus walking or lifting on the other days  -Improve sleep hygiene   -practice self-acceptance and positive self-talk  -long term goal to manage anxiety symptoms without medication      Diagnosis:   1. Generalized anxiety disorder    no change    Plan and Follow-up:   Patient plans to continue taking the medication as prescribed.   Patient plans to follow up with therapy in 2 weeks    \"think about anxiety as a set of symptoms as opposed to a problem that must be fixed\"    HW:   -practice grounding techniques  -maintain work-life balance   -practice self-acceptance at all times   -make notes in between sessions  -continue yoga practice   -advocate and articulate needs      Discharge Criteria/Planning: Patient will continue with follow-up until therapy can be discontinued without return of signs and symptoms.    I have reviewed the note as documented above.  This accurately captures the substance of my conversation with the patient.    As the provider I attest to compliance with applicable laws and regulations related to telemedicine.  Performed and documented by (Huyen Espino, [facility]) 10/12/2020  "

## 2021-06-13 NOTE — PROGRESS NOTES
Mental Health Visit Note    Patient: Dylan Neal    : 1990 MRN: [id number]    2020    Start time: 4:30pm    Stop Time: 5:15pm   Session # 9    Session Type: Patient is presenting for an Individual session.    Dylan Neal is a 30 y.o. male is being seen today for    Chief Complaint   Patient presents with     MH Follow Up     Psychotherapy follow-up visit for anxiety   .     Telemedicine Visit: The patient's condition can be safely assessed and treated via synchronous audio and visual telemedicine encounter.      Reason for Telemedicine Visit: Patient has requested telehealth visit and Services only offered telehealth    Originating Site (Patient Location): Patient's home    Distant Site (Provider Location): Provider Remote Setting- Home Office    Consent:  The patient/guardian has verbally consented to: the potential risks and benefits of telemedicine (video visit) versus in person care; bill my insurance or make self-payment for services provided; and responsibility for payment of non-covered services.     Mode of Communication:  Video Conference via [employer]    As the provider I attest to compliance with applicable laws and regulations related to telemedicine.    Those present for this visit include patient and therapist    Follow up in regards to ongoing symptom management of anxiety    New symptoms or complaints: None    Functional Impairment:   Personal: 2  Family: 1  Social: 1  Work: 2    Clinical assessment of mental status:   Dylan Neal presented on time.   He was oriented x3, open and cooperative, and dressed appropriately for this session and weather. His memory was Normal cognitive functioning .  His speech was  Within normal.  Language was within normal.  Concentration and focus is Within normal. Psychosis is not noted or reported. He reports his mood is Anxious.  Affect is congruent with speech and is Congruent w/content of speech.  Fund of knowledge is  adequate. Insight is adequate for therapy.    ASSESSMENT: Current Emotional / Mental Status (status of significant symptoms):              Risk status (Self / Other harm or suicidal ideation)              Patient denies risks to personal safety              Patient denies current or recent suicidal ideation or behaviors.              Patient denies current or recent homicidal ideation or behaviors.              Patient denies current or recent self injurious behavior or ideation.              Patient denies other safety concerns.              Patient denies risk factors impacting status              Patient denies changes to protective factors (; employed; educated)              Recommended that patient call 911 or go to the local ED should there be a change in any of these risk factors.    Patient's impression of their current status:   Patient's impression is that he is feeling better since his last visit. He has been mindful of his caffeine intake and exercise. He would like to incorporate more of an exercise routine (M, W, F).   He is noticing that he's moving through the anxiety with greater ease and awareness.       Therapist impression of patients current state: This 30 y.o. White or  male presenting with symptoms of anxiety.  Patient easily engaged in dialogue about presenting symptoms and improvements. He was receptive to therapist feedback and recommendations. He is adequately participating in the therapeutic process.       Assessment tools used today include: CGI and can be found documented in Doc Flowsheets.     Type of psychotherapeutic technique provided: Insight oriented and CBT   Therapist recommended that patient practice mindfulness skills throughout the day to manage anxiety symptoms.  Therapist provided unconditional positive regard and support while helping patient make connections between thoughts, feelings and behaviors.     Progress toward short term goals:Progress as  expected, with patient making steps towards managing anxiety symptoms using learned techniques and skills. He also followed through with plans to reduce caffeine intake.     Review of long term goals:  Not done at today's visit   Treatment Plan updated on 8/11/2020  Manage anxiety in order to reduce patterns of irritability, reduce negative thoughts of self, improve concentration and memory    Diagnosis:   1. Generalized anxiety disorder    no change    Plan and Follow-up:   Patient plans to continue taking medications as prescribed.   Patient plans to follow up with therapy in 2 weeks  Patient plans to practice grounding skills and deep breathing to help manage anxiety symptoms  Patient plans to continue yoga practice and increasing other forms of exercise     Discharge Criteria/Planning: Patient will continue with follow-up until therapy can be discontinued without return of signs and symptoms.    I have reviewed the note as documented above.  This accurately captures the substance of my conversation with the patient.    As the provider I attest to compliance with applicable laws and regulations related to telemedicine.  Performed and documented by (Huyen Espino [facility]) 11/23/2020

## 2021-06-14 NOTE — PROGRESS NOTES
Dylan Neal is a 31 y.o. male who is being evaluated via a billable video visit.      How would you like to obtain your AVS? MyChart.  If dropped from the video visit, the video invitation should be resent by:   Will anyone else be joining your video visit? No      Video Start Time: 12:54 PM    1. Environmental and seasonal allergies  I suggested that he start a over-the-counter Claritin or Zyrtec on a daily basis.  I also reminded him that he can use the hydroxyzine as he would Benadryl as it will help with the pruritus and could help him sleep at night.  He can continue with his Flonase.  I will refer him to Rica Ennis.  - Ambulatory referral to Allergy    2. Intrinsic eczema  He can continue with his topical steroid.  We discussed that oral steroid probably really did a complete job of treating for his eczema, but she cannot continue this for too long.  Treating his allergies would be helpful.  Also noted is that he has eosinophilic esophagitis and these treatments will help this condition as well.  - Ambulatory referral to Allergy    3. MEERA (generalized anxiety disorder)  When I last saw him PHQ-9 was 13 but now improved to 2.  MEERA-7 score was 14 and improved to 5 today.  He is to continue with his therapist since this is working so well.  He has refills of his hydroxyzine if needed.    He can follow-up with me on an as-needed basis and in no longer than 1 year if he needs refills.        Subjective     Dylan Neal is 31 y.o. and presents to clinic today for the following health issues   HPI   Patient is joined by video to discuss his allergies and eczema which have been on him since before Christmas.  They seem to be really bad right now.  He has been using over-the-counter Flonase.  He does not use Zyrtec or Claritin type medicine.  He started having worsening of his eczema and it became generalized, sort of all over his body.  So he went to see his dermatologist Rayray Gallo in January.  He  was given a topical cream and an oral dose of prednisone.  When he did this the rash cleared completely.  But then it has started to come back slowly.  He does note that he has a new cat now for like 9 months.  He also thinks may be foods are bothering him.  We discussed it could be the fact that the weather is allowing for freezing and thawing back-and-forth and because of this allergies have been bad for people.  He has not seen an allergist as far as he knows for a very long time.  We discussed a referral to see Rica Fox.  When I last saw him he was dealing with quite a bit of anxiety.  I had treated him with hydroxyzine, atenolol 25 mg at bed and I put through a referral to a therapist.  He has been seeing a therapist and that really helped.  The hydroxyzine helped as well.  He did not take the atenolol because he felt he did not need it.  He still has the prescription.  We discussed that the hydroxyzine might be very helpful to use before bedtime for his allergies/eczema.  He is not sure he has refills and so I tell him I will look and resend it if necessary.  His PHQ-9 score when I saw him last was 13 and is down to 2 today.  His MEERA-7 score was 14 and is down to 5 today.  He likes his therapist and has a future appointment set up.        Review of Systems  Positive for eczema, irritated eyes, runny nose, pruritic rash      Objective       Vitals:  No vitals were obtained today due to virtual visit.    Physical Exam  General: Healthy appearing normal weight young gentleman in no acute distress  HEENT: Right greater than left eye looks to have raccoon eyes, facial rash on the right side of cheek/jaw, no noticeable rhinorrhea  Lungs: Patient is speaking and breathing comfortably without cough or wheeze  Skin: Rash on left wrist, face            Video-Visit Details    Type of service:  Video Visit    Video End Time (time video stopped): 1:31 PM  Originating Location (pt. Location): Home    Distant Location  (provider location):  [facility]     Platform used for Video Visit: [employer]

## 2021-06-15 PROBLEM — E78.5 HYPERLIPIDEMIA: Chronic | Status: ACTIVE | Noted: 2017-05-04

## 2021-06-15 NOTE — PROGRESS NOTES
Mental Health Visit Note    Patient: Dylan Neal    : 1990 MRN: [id number]    2021    Start time: 4:00pm    Stop Time: 4:50pm   Session # 1    Session Type: Patient is presenting for an Individual session.    Dylan Neal is a 31 y.o. male is being seen today for    Chief Complaint   Patient presents with     MH Follow Up     Psychotherapy follow-up visit for anxiety    .     Telemedicine Visit: The patient's condition can be safely assessed and treated via synchronous audio and visual telemedicine encounter.      Reason for Telemedicine Visit: Patient has requested telehealth visit and Services only offered telehealth    Originating Site (Patient Location): Patient's home    Distant Site (Provider Location): Provider Remote Setting- Home Office    Consent:  The patient/guardian has verbally consented to: the potential risks and benefits of telemedicine (video visit) versus in person care; bill my insurance or make self-payment for services provided; and responsibility for payment of non-covered services.     Mode of Communication:  Video Conference via [employer]    As the provider I attest to compliance with applicable laws and regulations related to telemedicine.    Those present for this visit include patient and therapist    Follow up in regards to ongoing symptom management of anxiety    New symptoms or complaints: None    Functional Impairment:   Personal: 2  Family: 1  Social: 1  Work: 2    Clinical assessment of mental status:   Dylan Neal presented on time.   He was oriented x3, open and cooperative, and dressed appropriately for this session and weather. His memory was Normal cognitive functioning .  His speech was  Within normal.  Language was within normal.  Concentration and focus is Within normal. Psychosis is not noted or reported. He reports his mood is Anxious.  Affect is congruent with speech and is Congruent w/content of speech.  Fund of knowledge is  "adequate. Insight is adequate for therapy.    ASSESSMENT: Current Emotional / Mental Status (status of significant symptoms):              Risk status (Self / Other harm or suicidal ideation)              Patient denies risks to personal safety              Patient denies current or recent suicidal ideation or behaviors.              Patient denies current or recent homicidal ideation or behaviors.              Patient denies current or recent self injurious behavior or ideation.              Patient denies other safety concerns.              Patient denies risk factors impacting status              Patient denies changes to protective factors (; employed; educated)              Recommended that patient call 911 or go to the local ED should there be a change in any of these risk factors.    Patient's impression of their current status:   Patient's impression is that he discontinued services back in November because he has been dealing with severe allergies and eczema. Despite the medical concerns, his anxiety has been \"decent.\"  He does report feeling more irritated lately.      Therapist impression of patients current state: This 31 y.o. White or  male presenting with symptoms of anxiety.  Patient easily engaged in dialogue about presenting problems. Therapist administered assessment measurements to evaluate underlying mental health symptoms. He is receptive to therapist feedback and openly discussed expectations for therapy.       Assessment tools used today include: CGI, PHQ-9, MEERA-7 and can be found documented in Doc Flowsheets.     Type of psychotherapeutic technique provided: Insight oriented and CBT     Progress toward short term goals:Progress as expected, with patient making steps towards managing anxiety symptoms using learned techniques and skills. He has maintained stability in functioning despite worsening medical issues.     Review of long term goals:   Treatment Plan updated   PHQ-9 = " 6  MEERA-7 = 8    Diagnosis:   1. Generalized anxiety disorder    no change    Plan and Follow-up:   Patient plans to continue taking medications as prescribed.   Patient plans to follow up with therapy in 2 weeks - Patient would like to resume therapy visits for every other week  He has an appointment with an allergist tomorrow to determine the root cause of his current skin condition       Discharge Criteria/Planning: Patient will continue with follow-up until therapy can be discontinued without return of signs and symptoms.    I have reviewed the note as documented above.  This accurately captures the substance of my conversation with the patient.    As the provider I attest to compliance with applicable laws and regulations related to telemedicine.  Performed and documented by (Huyen Espino, [facility]) 2/22/2021

## 2021-06-15 NOTE — PROGRESS NOTES
"        Subjective   Dylan Neal is 31 y.o. and presents today for the following health issues   HPI chief complaint: Eczema    History of present illness: This is a pleasant 31-year-old gentleman that I was asked to see for evaluation by Dr. Lopez regards to eczema.  He states he had asthma his whole life but it worsened over the last few months.  He states that he has it all over the place, he says he has it on his hands arms face neck and groin as well as his trunk and legs.  He states he was seen by dermatology and given Keflex for possible infection.  He had a staph infection last spring.  He states that he was given prednisone as well as fluticasone cream.  He has been using hydroxyzine as needed for itch.  He reports that the eczema has become almost unbearable.  He denies any chemicals or exposures.  They have been using unscented laundry detergent but have been using dryer sheets with a close.  He has been using Aveeno body wash or soap.  He does use Eucerin for a moisturizer.  He states he was tested many years ago for allergies.  He does note itchy eyes and runny nose as well as sneezing.  No history of asthma.  He has been diagnosed with eosinophilic esophagitis and notes that breads seem to trigger his symptoms.  He states when he eats shellfish he seems to get a sick stomach and this happens as well with avocados.    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  The cat was brought into their home last July, non-smoker, non-smoking environment    Family history: Mother with allergies            Review of Systems  Review of Systems performed as above and the remainder is negative.      Objective    Pulse 81   Ht 5' 10\" (1.778 m)   Wt 170 lb (77.1 kg)   SpO2 97%   BMI 24.39 kg/m    Body mass index is 24.39 kg/m .  Physical Exam  Gen: Pleasant male not in acute " distress  HEENT: Eyes no erythema of the bulbar or palpebral conjunctiva, no edema. Ears: TMs well visualized, no effusions. Nose: No congestion, . Mouth: Throat clear,    Neck: No masses or lesions but a large amount of eczema on the neck  Respiratory: No coughing with breathing, no retractions  Lymph: No visible supraclavicular or cervical lymphadenopathy  Skin: Severe eczema throughout on the hands, face, neck, no areas of infection  Psych: Alert and oriented times 3    Last Percutaneous Allergy Test Results  Trees  David, White  1:20 H  (W/F in mm): 0 (02/23/21 1140)  Birch Mix 1:20 H (W/F in mm): 0 (02/23/21 1140)  [place], Common 1:20 H (W/F in mm): 0 (02/23/21 1140)  Elm, American 1:20 H (W/F in mm): 0 (02/23/21 1140)  [place], Shagbark 1:20 H (W/F in mm): 0 (02/23/21 1140)  Maple, Hard/Sugar 1:20 H (W/F in mm): 0 (02/23/21 1140)  [place] Mix 1:20 H (W/F in mm): 0 (02/23/21 1140)  Sieper, Red 1:20 H (W/F in mm): 0 (02/23/21 1140)  Willis, American 1:20 H (W/F in mm): 0 (02/23/21 1140)  [place] Tree 1:20 H (W/F in mm): 0 (02/23/21 1140)  Dust Mites  D. Pteronyssinus Mite 30,000 AU/ML H (W/F in mm): 8/11 (02/23/21 1140)  D. Farinae Mite 30,000 AU/ML H (W/F in mm: 4/6 (02/23/21 1140)  Grasses  Grass Mix #4 10,000 BAU/ML H: 0 (02/23/21 1140)  Christiano Grass 1:20 H (W/F in mm): 0 (02/23/21 1140)  Cockroach  Cockroach Mix 1:10 H (W/F in mm): 0 (02/23/21 1140)  Molds/Fungi  Alternaria Tenuis 1:10 H (W/F in mm): 9/12 (02/23/21 1140)  Aspergillus Fumigatus 1:10 H (W/F in mm): 0 (02/23/21 1140)  Homodendrum Cladosporioides 1:10 H (W/F in mm): 0 (02/23/21 1140)  Penicillin Notatum 1:10 H (W/F in mm): 0 (02/23/21 1140)  Epicoccum 1:10 H (W/F in mm): 0 (02/23/21 1140)  Weeds  Ragweed, Short 1:20 H (W/F in mm): 0 (02/23/21 1140)  Dock, [place] 1:20 H (W/F in mm): 0 (02/23/21 1140)  Lamb's Quarter 1:20 H (W/F in mm): 0 (02/23/21 1140)  Pigweed, Rough Red Root 1:20 H  (W/F in mm): 0 (02/23/21 1140)  Plantain, English 1:20 H   (W/F in mm): 0 (02/23/21 1140)  Sagebrush, Mugwort 1:20 H  (W/F in mm): 0 (02/23/21 1140)  Animal  Cat 10,000 BAU/ML H (W/F in mm): 12/22 (02/23/21 1140)  AP Dog Hair/Dander 1:100: 15/26 (02/23/21 1140)  Controls  Neg. control: 50% Glycerine/Saline H (W/F in mm): 0/0 (02/23/21 1140)  Pos. control: Histamine 6mg/ML (W/F in mms): 8/10 (02/23/21 1140)        Impression report and plan:  1.  Severe atopic dermatitis    SCORAD 94.25.  Reviewed sensitive skin care tips, recommended bleach baths as well as wet wraps.  Suggested a trial of Robathol bath oil.  Reviewed environmental control.  Recommended montelukast.  Cautioned him to the rare side effect of mood disturbance.  He is quite severe today on exam.  For this reason I recommend a trial of Dupixent.  Went over the risks and benefits of this medication.  He has been on oral prednisone.    2.  Allergic rhinitis  3.  Eosinophilic esophagitis    Check specific IgE to foods that typically trigger eosinophilic esophagitis including shrimp and avocado.  Stated that foods do not typically trigger eczema in a patient of this age but this may provide some guidance regarding his eosinophilic esophagitis but stated that testing is inadequate for this disorder.  Went over the  specific trigger foods.

## 2021-06-15 NOTE — PATIENT INSTRUCTIONS - HE
Dust mite control    Wash bedding weekly, covers, keep humidity <50%    Keep cat out of bedroom, air purifier, keep cat well groomed    Eosinophilic esophagitis    Check bloodwork--wheat, soy, milk egg    Wet wraps    Bleach baths    Diluted bleach bath recipe and instructions  Add   -   cup of common 5% household bleach to a bathtub full of water (40 gallons). Soak your torso or just the affected part of your skin for about 10 minutes. Limit diluted bleach baths to no more than twice a week. Do not submerge your head and be very careful to avoid getting the diluted bleach into the eyes. Rinse off with fresh water and apply moisturizer.      Sensitive Skin Care Tips     1.  Bathe in tepid tap water every day for 5-10 minutes using a mild soap (Dove or Purpose) only to dirty parts). Rinse.  2. If using Robathol Bath oil, add a tablespoon to bath  Soak for another 5-10  minutes.  Drains may clog.  3. After bath, pat skin dry leaving a small amount moisture on the skin.  4. Apply cortisone ointment________ to red, rough areas of skin as directed.    5. Apply moisturizer to all skin._____Eucerin_________________  a. Be sure to use moisturizer on top of prescription cream.    6. If possible, apply all ointments to skin another time during the day.  7. If scaling present in scalp, may apply mineral oil 1-1.5 hours before shampooing.  Use a fine comb or soft brush to gently remove scales.  8. Use unscented laundry detergent (Tide unscented, Cheer Free, All Free and Clear, Wisk unscented)  9. Avoid fabric softeners or dryer sheets in the washer and dryer.    10. Avoid exposure to second-hand smoke    Montelukast    Dupixent

## 2021-06-15 NOTE — PROGRESS NOTES
Outpatient Mental Health Treatment Plan    Name:  Dylan Neal  :  1990  MRN:  [id number]    Treatment Plan:  Updated Treatment Plan  Intake/initial treatment plan date:  Intake: 20  Benefit and risks and alternatives have been discussed: Yes  Is this treatment appropriate with minimal intrusion/restrictions: Yes  Estimated duration of treatment:  Approximately 10 sessions.  Anticipated frequency of services:  Every 2 weeks  Necessity for frequency: This frequency is needed to establish therapeutic goals and for continuity of care in order to monitor progress.  Necessity for treatment: To address cognitive, behavioral, and/or emotional barriers in order to work toward goals and to improve quality of life.    Session Type: Patient is presenting for an Individual session. via [employer] video conference due to COVID19    Plan:           ?   ? Anxiety    Goal:  Decrease average anxiety level from 2 to 1.   Strategies: ? [x]Learn and practice relaxation techniques and other coping strategies (e.g., thought stopping, reframing, meditation)     ? [x] Increase involvement in meaningful activities     ? [x] Discuss sleep hygiene     ? [x] Explore thoughts and expectations about self and others     ? [x] Identify and monitor triggers for panic/anxiety symptoms     ? [x] Implement physical activity routine (with physician approval)     ? [x] Consider introduction of bibliotherapy and/or videos     ? [x] Continue compliance with medical treatment plan (or explore barriers)   ?Degree to which this is a problem: 2  Degree to which goal is met: 3  Date of Review: May 2021         Functional Impairment:  1=Not at all/Rarely  2=Some days  3=Most Days  4=Every Day    Personal : 2  Family : 1  Social : 1   Work/school : 2    Diagnosis:  (EXAMPLE of DSM V: Major depressive disorder, recurrent, moderate; Generalized Anxiety disorder; borderline personality per patient PHI; fibromyalgia, History of breast cancer in  remission; Problem with primary relationship.)   Generalized Anxiety Disorder        Strengths:  insightful, articulate, communicative, educated, resourceful, good support system  Limitations:  no history of mh treatment or sevices  Cultural Considerations: 30 year old  male - no significant cultural considerations for therapy    Persons responsible for this plan: Patient and Provider  Pt unable to sign due to virtual visit          Psychotherapist Signature           Patient Signature:              Guardian Signature             Provider: Performed and documented by SYL Ivey   Date:  2/22/2021

## 2021-06-16 PROBLEM — F32.9 REACTIVE DEPRESSION: Status: ACTIVE | Noted: 2020-06-11

## 2021-06-16 PROBLEM — L20.84 INTRINSIC ECZEMA: Status: ACTIVE | Noted: 2021-02-01

## 2021-06-16 PROBLEM — F41.1 GAD (GENERALIZED ANXIETY DISORDER): Chronic | Status: ACTIVE | Noted: 2021-02-01

## 2021-06-16 PROBLEM — J30.89 ENVIRONMENTAL AND SEASONAL ALLERGIES: Status: ACTIVE | Noted: 2021-02-01

## 2021-06-16 PROBLEM — K20.0 EOSINOPHILIC ESOPHAGITIS: Status: ACTIVE | Noted: 2019-11-21

## 2021-06-16 NOTE — PROGRESS NOTES
Assessment:      Healthy male exam.    1. Annual physical exam  Td, Preservative Free (green label)   2. Familial hypercholesterolemia  Lipid Cascade FASTING    Hepatic Profile   3. Hyperglycemia  Glycosylated Hemoglobin A1c   4. Sweet urine odor  Urinalysis- if Indicated          Plan:       Blood tests: See above mentioned lab work.  I will get back to him by my chart.  Discussed healthy lifestyle modifications.  Follow up: Return in about 1 year (around 2/26/2019) for Annual physical.     Subjective:      Dylan Neal is a 28 y.o. male who presents for an annual exam. The patient reports that there is not domestic violence in his life.  Patient is relatively new to me.  His last labs were in April 2017.  He had quite high cholesterol numbers including a total of 249 and an LDL of 168.  He believes his dad also had this at a young age.  He also had an elevated glucose at 129.  He complains of having sweet smelling urine at times.  Since finding this out he has changed his eating habits a lot.  First while he has decreased his alcohol intake because he was quite a binge drinker.  He is not eating very much fast food and making better choices when he is out to eat.  He only drinks soda occasionally and he has been regular with going to the gym.  He is running and lifting.  He has better energy because of all of this.  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  People are pretty health-conscious there and so that helps him a lot.  He hopes this leads to a nice future.  He is engaged to be .  They plan to do a courthouse marriage with a reception at his parent's house.  At one time they thought maybe they would do the wedding in [place] but that seemed to be too expensive for everyone.  He was once a smoker but has not had any smokes for quite some time now.  I congratulated him.  He is due for a tetanus  update.    Healthy Habits:   Regular Exercise: Yes, lift weights, running  Sunscreen Use: Yes  Healthy Diet: Yes  Dental Visits Regularly: Yes  Seat Belt: Yes  Sexually active: Yes  Monthly Self Testicular Exams:  No  Hemoccults: N/A  Flex Sig: N/A  Colonoscopy: N/A  Lipid Profile: 4/17  Glucose Screen: No  Prevention of Osteoporosis: No and N/A  Last Dexa: N/A  Guns at Home:  No      Immunization History   Administered Date(s) Administered     DT (pediatric) 06/14/2000     Hep B, Adult 06/14/2000, 08/16/2000, 04/14/2008     Hep B, historic 06/14/2000, 08/16/2000, 04/14/2008     MMR 11/13/1989, 06/14/2000     Td, adult adsorbed, PF 02/26/2018     Td,adult,historic,unspecified 04/14/2008     Tdap 04/14/2008     Immunization status: up to date and documented, Td due today.    No exam data present     Current Outpatient Prescriptions   Medication Sig Dispense Refill     cyclobenzaprine (FLEXERIL) 10 MG tablet   0     methylPREDNISolone (MEDROL DOSEPACK) 4 mg tablet   0     No current facility-administered medications for this visit.      Past Medical History:   Diagnosis Date     Backache     Created by Conversion      Esophageal reflux     Created by Conversion      Ureteral stone 12/12/2015     Past Surgical History:   Procedure Laterality Date     none       Dust [other environmental allergy]; Percocet [oxycodone-acetaminophen]; and Pollen extracts  Family History   Problem Relation Age of Onset     Hypertension Father      Hyperlipidemia Father      Cancer Maternal Grandmother      lung from heavy smoking     Heart disease Maternal Grandfather      Urolithiasis Paternal Grandfather      recurrent x2     Heart disease Paternal Grandfather      Heart attack Paternal Grandfather      Diabetes Cousin      Social History     Social History     Marital status: Single     Spouse name: Marina     Number of children: N/A     Years of education: 14     Occupational History     sales [employer]  "History Main Topics     Smoking status: Former Smoker     Types: Cigarettes     Smokeless tobacco: Never Used     Alcohol use Yes     Drug use: No     Sexual activity: Yes     Partners: Female     Other Topics Concern     Not on file     Social History Narrative       Review of Systems  Review of Systems   General ROS: negative  Psychological ROS: negative  Ophthalmic ROS: negative  ENT ROS: negative  Allergy and Immunology ROS: negative  Hematological and Lymphatic ROS: negative  Endocrine ROS: positive for -sweet smelling urine  Breast ROS: negative  Respiratory ROS: negative  Cardiovascular ROS: positive for - palpitations  Gastrointestinal ROS: positive for - heartburn which has been improved with changes in diet  Genito-Urinary ROS: positive for -sweet smelling urine  Musculoskeletal ROS: negative  Neurological ROS: negative  Dermatological ROS: negative          Objective:     Vitals:    02/26/18 0911   BP: 128/72   Pulse: 92   SpO2: 97%   Weight: 146 lb (66.2 kg)   Height: 5' 10\" (1.778 m)     Body mass index is 20.95 kg/(m^2).    Physical  Physical Exam   General appearance - alert, well appearing, and in no distress and normal appearing weight  Mental status - normal mood, behavior, speech, dress, motor activity, and thought processes  Eyes - pupils equal and reactive, extraocular eye movements intact  Ears - bilateral TM's and external ear canals normal  Nose - normal and patent, no erythema, discharge or polyps  Mouth - mucous membranes moist, pharynx normal without lesions  Neck - supple, no significant adenopathy, carotids upstroke normal bilaterally, no bruits, thyroid exam: thyroid is normal in size without nodules or tenderness  Lymphatics - no palpable lymphadenopathy, no hepatosplenomegaly  Chest - clear to auscultation, no wheezes, rales or rhonchi, symmetric air entry  Heart - normal rate and regular rhythm, S1 and S2 normal, no murmurs noted  Abdomen - soft, nontender, nondistended, no masses or " organomegaly   Male - no penile lesions or discharge, no testicular masses or tenderness, no hernias  Back exam - full range of motion, no tenderness, palpable spasm or pain on motion  Neurological - alert, oriented, normal speech, no focal findings or movement disorder noted, cranial nerves II through XII intact, DTR's normal and symmetric  Musculoskeletal - no joint tenderness, deformity or swelling  Extremities - peripheral pulses normal, no pedal edema, no clubbing or cyanosis  Skin - normal coloration and turgor, no rashes, no suspicious skin lesions noted

## 2021-06-16 NOTE — PROGRESS NOTES
Mental Health Visit Note    Patient: Dylan Neal    : 1990 MRN: [id number]    2021    Start time: 4:00pm    Stop Time: 4:50pm   Session # 3    Session Type: Patient is presenting for an Individual session.    Dylan Neal is a 31 y.o. male is being seen today for    Chief Complaint   Patient presents with     MH Follow Up     Psychotherapy follow-up visit for anxiety    .     Telemedicine Visit: The patient's condition can be safely assessed and treated via synchronous audio and visual telemedicine encounter.      Reason for Telemedicine Visit: Patient has requested telehealth visit and Services only offered telehealth    Originating Site (Patient Location): Patient's home    Distant Site (Provider Location): Provider Remote Setting- Home Office    Consent:  The patient/guardian has verbally consented to: the potential risks and benefits of telemedicine (video visit) versus in person care; bill my insurance or make self-payment for services provided; and responsibility for payment of non-covered services.     Mode of Communication:  Video Conference via [employer]    As the provider I attest to compliance with applicable laws and regulations related to telemedicine.    Those present for this visit include patient and therapist    Follow up in regards to ongoing symptom management of anxiety    New symptoms or complaints: None    Functional Impairment:   Personal: 2  Family: 1  Social: 1  Work: 2    Clinical assessment of mental status:   Dylan Neal presented on time.   He was oriented x3, open and cooperative, and dressed appropriately for this session and weather. His memory was Normal cognitive functioning .  His speech was  Within normal.  Language was within normal.  Concentration and focus is Within normal. Psychosis is not noted or reported. He reports his mood is Anxious.  Affect is congruent with speech and is Congruent w/content of speech.  Fund of knowledge is  adequate. Insight is adequate for therapy.    ASSESSMENT: Current Emotional / Mental Status (status of significant symptoms):              Risk status (Self / Other harm or suicidal ideation)              Patient denies risks to personal safety              Patient denies current or recent suicidal ideation or behaviors.              Patient denies current or recent homicidal ideation or behaviors.              Patient denies current or recent self injurious behavior or ideation.              Patient denies other safety concerns.              Patient denies risk factors impacting status              Patient denies changes to protective factors (; employed; educated)              Recommended that patient call 911 or go to the local ED should there be a change in any of these risk factors.     Mental status reviewed and no changes noted as of 4/13/21      Patient's impression of their current status:   Patient's impression is that he is doing pretty well. He resolved a minor conflict with a work colleague. He shares that he has had some experiences with feeling anger and frustration at work. He does not have the same outlet or release as he did during the pandemic. He's finding challenges in working from home. He shares that his allergies and skin condition have greatly improved. The diet change has greatly improved his energy level and mood.      Therapist impression of patients current state: This 31 y.o. White or  male presenting with symptoms of anxiety.  Patient easily engaged in dialogue. He has good awareness into mental health triggers. He is receptive to therapist support around managing anxiety symptoms using mindfulness skills.      Assessment tools used today include: CGI and can be found documented in Doc Flowsheets.     Type of psychotherapeutic technique provided: Insight oriented and CBT     Progress toward short term goals:Progress as expected, with patient making steps towards  managing anxiety symptoms using learned techniques and skills.      Review of long term goals:   Not done at today's visit and Long-term goals reviewed 2/22/21     Diagnosis:   1. Generalized anxiety disorder    improving     Plan and Follow-up:   Patient plans to continue taking medications as prescribed.   Patient plans to follow up with therapy in 3 weeks  Patient and therapist will discuss future scheduling dates at next visit       Discharge Criteria/Planning: Patient will continue with follow-up until therapy can be discontinued without return of signs and symptoms.    I have reviewed the note as documented above.  This accurately captures the substance of my conversation with the patient.    As the provider I attest to compliance with applicable laws and regulations related to telemedicine.  Performed and documented by (Huyen Espino, [facility]) 4/13/2021

## 2021-06-16 NOTE — PROGRESS NOTES
Mental Health Visit Note    Patient: Dylan Neal    : 1990 MRN: [id number]    3/22/2021    Start time: 4:30pm    Stop Time: 5:20pm   Session # 2    Session Type: Patient is presenting for an Individual session.    Dylan Neal is a 31 y.o. male is being seen today for    Chief Complaint   Patient presents with     MH Follow Up     Psychotherapy follow-up visit for anxiety   .     Telemedicine Visit: The patient's condition can be safely assessed and treated via synchronous audio and visual telemedicine encounter.      Reason for Telemedicine Visit: Patient has requested telehealth visit and Services only offered telehealth    Originating Site (Patient Location): Patient's home    Distant Site (Provider Location): Provider Remote Setting- Home Office    Consent:  The patient/guardian has verbally consented to: the potential risks and benefits of telemedicine (video visit) versus in person care; bill my insurance or make self-payment for services provided; and responsibility for payment of non-covered services.     Mode of Communication:  Video Conference via [employer]    As the provider I attest to compliance with applicable laws and regulations related to telemedicine.    Those present for this visit include patient and therapist    Follow up in regards to ongoing symptom management of anxiety    New symptoms or complaints: None    Functional Impairment:   Personal: 2  Family: 1  Social: 1  Work: 2    Clinical assessment of mental status:   Dylan Neal presented on time.   He was oriented x3, open and cooperative, and dressed appropriately for this session and weather. His memory was Normal cognitive functioning .  His speech was  Within normal.  Language was within normal.  Concentration and focus is Within normal. Psychosis is not noted or reported. He reports his mood is Anxious.  Affect is congruent with speech and is Congruent w/content of speech.  Fund of knowledge is adequate.  Insight is adequate for therapy.    ASSESSMENT: Current Emotional / Mental Status (status of significant symptoms):              Risk status (Self / Other harm or suicidal ideation)              Patient denies risks to personal safety              Patient denies current or recent suicidal ideation or behaviors.              Patient denies current or recent homicidal ideation or behaviors.              Patient denies current or recent self injurious behavior or ideation.              Patient denies other safety concerns.              Patient denies risk factors impacting status              Patient denies changes to protective factors (; employed; educated)              Recommended that patient call 911 or go to the local ED should there be a change in any of these risk factors.    Patient's impression of their current status:   Patient's impression is that he is doing a bit better because his face has cleared up from the skin problems. He found out what he is allergic to which has resulted in some lifestyle changes. He reports actually feeling much better overall in regards to energy now that he has made dietary changes.   He shares that work has been fairly stressful.      Therapist impression of patients current state: This 31 y.o. White or  male presenting with symptoms of anxiety.  Patient easily engaged in dialogue. His physical health has greatly improved since his last visit resulting in manageable mental health symptoms. He is receptive to therapist feedback around setting boundaries with a coworker and using assertive communication techniques to help express his needs.      Assessment tools used today include: CGI and can be found documented in Doc Flowsheets.     Type of psychotherapeutic technique provided: Insight oriented and CBT     Progress toward short term goals:Progress as expected, with patient making steps towards managing anxiety symptoms using learned techniques and skills.       Review of long term goals:   Not done at today's visit and Long-term goals reviewed 2/22/21     Diagnosis:   1. Generalized anxiety disorder    no change    Plan and Follow-up:   Patient plans to continue taking medications as prescribed.   Patient plans to follow up with therapy in 3 weeks  Patient plans to call his coworker tonight in order to establish some boundaries       Discharge Criteria/Planning: Patient will continue with follow-up until therapy can be discontinued without return of signs and symptoms.    I have reviewed the note as documented above.  This accurately captures the substance of my conversation with the patient.    As the provider I attest to compliance with applicable laws and regulations related to telemedicine.  Performed and documented by (Huyne Espino, [facility]) 3/22/2021

## 2021-06-17 NOTE — TELEPHONE ENCOUNTER
Telephone Encounter by Kandice Vogt at 2/25/2021  9:19 AM     Author: Kandice oVgt Service: -- Author Type: Financial Resource Guide    Filed: 2/25/2021  9:23 AM Encounter Date: 2/25/2021 Status: Signed    : Kandice Vogt (Financial Resource Guide)       PA Initiation  Medication: Dupixent 300mg/2ml  QTY: 3 pens for the first 28 days then 2 pens for 28 days thereafter   Insurance Company: [employer]  Pharmacy Filing Rx: pending  Filling Pharmacy Phone: [facility]  Filling Pharmacy Fax: na  Start Date: 2/25/21  Additional Information: [facility]

## 2021-06-17 NOTE — TELEPHONE ENCOUNTER
Telephone Encounter by Kandice Vogt at 2/26/2021 12:32 PM     Author: Kandice Vogt Service: -- Author Type: Financial Resource Guide    Filed: 2/26/2021 12:36 PM Encounter Date: 2/25/2021 Status: Signed    : Kandice Vogt (Financial Resource Guide)       Prior Authorization Approval  Medication: Dupixent 300mg/2ml  Qty: 3 pens for first 28 days and then 2 pens for 28 days thereafter  Effective Dates: 1/26/21 - 6/25/21  Reference Number: [id number]  Insurance Company: [employer]  Pharmacy: [facility]  Expected Copay: not covered at this location   Copay Card Available: enrolled  Foundation Assistance Needed/Available: not needed  Patient Assistance Program Needed: not needed  Patient Notified? Yes  Pharmacy Notified? Yes

## 2021-06-17 NOTE — PROGRESS NOTES
SUBJECTIVE: Dylan Neal is a 28 y.o. White or  male who presents today with a complaint of difficulty initiating a urine stream in the morning and also having problems with decreased flow of urine.  He will have the feeling that he has to urinate right after already urinating.  He notes that his urine is sometimes cloudy and sometimes sweet smelling but the color always seems to be okay.  He does have history of kidney stones and had an ER event in 2015.  He had a CT done at that time and we reviewed but result which showed 2 right kidney stones that were nonobstructing and one kidney stone in the bladder.  He does have family history for kidney stones on his dad's side.  In the past he has had urines with trace blood as well as moderate blood.  He also describes an event or 2 between the time I last saw him which was not long ago and now where he has a very intense shooting pain in the right flank area when lifting his arm above his head.  It is always very short-lived and is happened at least twice.  He denies any issues with ejaculation/sexual intercourse.  He feels well otherwise.  He also has a history of hyperlipidemia which is familial.  Back in February when I saw him he had really changed his diet and habits and brought his cholesterol down naturally from 249 to153.  His LDL was reduced from 168 to 94.  He does tell me he is a little more lax with his diet than he was at that time.  He had lost some weight but not a lot and he does not have a lot to lose.    OBJECTIVE: /86 (Patient Site: Left Arm, Patient Position: Sitting, Cuff Size: Adult Regular)  Pulse 92  Temp 98  F (36.7  C) (Oral)   Resp 20  Wt 151 lb 11.2 oz (68.8 kg)  BMI 21.77 kg/m2  General: Healthy-appearing normal weight young male in no acute distress  Heart: Regular rate and rhythm without murmur  Lungs: Clear bilaterally  Abdomen: Soft, nontender, no tenderness with percussion of the kidneys, but does have  some pain inferior to the kidney on the right side  Extremities: Warm, dry and without edema    UA came back positive for trace blood and otherwise was normal    ASSESSMENT & PLAN:    1. Urinary retention  Ambulatory referral to Urology   2. Urinary urgency  Urinalysis Macroscopic    Ambulatory referral to Urology   3. History of nephrolithiasis  Ambulatory referral to Urology   4. Familial hypercholesterolemia       We discussed it is possible that perhaps he has a small stone moving through the ureter which is blocking urine flow.  He is not in any pain at this time and so I think it makes sense to send him to the urologist for evaluation and treatment.  His issue may actually be something entirely different.  He will follow-up at his annual physical or sooner on an as-needed basis. 25 minutes spent together with the patient today, more than 50% spent in counseling, discussing the above topics.        Patient Active Problem List   Diagnosis     Esophageal Reflux     Backache     Ureteral stone     Hyperlipidemia       Current Outpatient Prescriptions on File Prior to Visit   Medication Sig Dispense Refill     cyclobenzaprine (FLEXERIL) 10 MG tablet   0     methylPREDNISolone (MEDROL DOSEPACK) 4 mg tablet   0     No current facility-administered medications on file prior to visit.

## 2021-06-17 NOTE — TELEPHONE ENCOUNTER
Telephone Encounter by Kandice Vogt at 2/26/2021 12:38 PM     Author: Kandice Vogt Service: -- Author Type: Financial Resource Guide    Filed: 2/26/2021 12:50 PM Encounter Date: 2/25/2021 Status: Signed    : Kandice Vogt (Financial Resource Guide)

## 2021-06-20 ENCOUNTER — HEALTH MAINTENANCE LETTER (OUTPATIENT)
Age: 31
End: 2021-06-20

## 2021-06-21 NOTE — LETTER
Letter by Rica Ennis MD at      Author: Rica Ennis MD Service: -- Author Type: --    Filed:  Encounter Date: 2/23/2021 Status: (Other)         February 23, 2021     Viridiana Lopez MD  [address]  [facility] [address]  [place] [address]    Patient: Dylan Neal   MR Number: [id number]   YOB: 1990   Date of Visit: 2/23/2021     Dear Dr. John MD:    Thank you for referring Dylan Neal to me for evaluation. He has severe eczema, and I recommended a trial of Dupixent.  I have included my note for your review.      If you have questions, please do not hesitate to call me.     Sincerely,        Rica Ennis MD        CC  No Recipients    Progress Notes:        Subjective   Dylan Neal is 31 y.o. and presents today for the following health issues   HPI chief complaint: Eczema    History of present illness: This is a pleasant 31-year-old gentleman that I was asked to see for evaluation by Dr. Lopez regards to eczema.  He states he had asthma his whole life but it worsened over the last few months.  He states that he has it all over the place, he says he has it on his hands arms face neck and groin as well as his trunk and legs.  He states he was seen by dermatology and given Keflex for possible infection.  He had a staph infection last spring.  He states that he was given prednisone as well as fluticasone cream.  He has been using hydroxyzine as needed for itch.  He reports that the eczema has become almost unbearable.  He denies any chemicals or exposures.  They have been using unscented laundry detergent but have been using dryer sheets with a close.  He has been using Aveeno body wash or soap.  He does use Eucerin for a moisturizer.  He states he was tested many years ago for allergies.  He does note itchy eyes and runny nose as well as sneezing.  No history of asthma.  He has been diagnosed with eosinophilic esophagitis and notes that  "breads seem to trigger his symptoms.  He states when he eats shellfish he seems to get a sick stomach and this happens as well with avocados.    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  The cat was brought into their home last July, non-smoker, non-smoking environment    Family history: Mother with allergies            Review of Systems  Review of Systems performed as above and the remainder is negative.      Objective    Pulse 81   Ht 5' 10\" (1.778 m)   Wt 170 lb (77.1 kg)   SpO2 97%   BMI 24.39 kg/m    Body mass index is 24.39 kg/m .  Physical Exam  Gen: Pleasant male not in acute distress  HEENT: Eyes no erythema of the bulbar or palpebral conjunctiva, no edema. Ears: TMs well visualized, no effusions. Nose: No congestion, . Mouth: Throat clear,    Neck: No masses or lesions but a large amount of eczema on the neck  Respiratory: No coughing with breathing, no retractions  Lymph: No visible supraclavicular or cervical lymphadenopathy  Skin: Severe eczema throughout on the hands, face, neck, no areas of infection  Psych: Alert and oriented times 3    Last Percutaneous Allergy Test Results  Trees  David, White  1:20 H  (W/F in mm): 0 (02/23/21 1140)  Birch Mix 1:20 H (W/F in mm): 0 (02/23/21 1140)  Hale, Common 1:20 H (W/F in mm): 0 (02/23/21 1140)  Elm, American 1:20 H (W/F in mm): 0 (02/23/21 1140)  Gratz, Shagbark 1:20 H (W/F in mm): 0 (02/23/21 1140)  Maple, Hard/Sugar 1:20 H (W/F in mm): 0 (02/23/21 1140)  [place] Mix 1:20 H (W/F in mm): 0 (02/23/21 1140)  [place], Red 1:20 H (W/F in mm): 0 (02/23/21 1140)  Flat Rock, American 1:20 H (W/F in mm): 0 (02/23/21 1140)  Welch Tree 1:20 H (W/F in mm): 0 (02/23/21 1140)  Dust Mites  D. Pteronyssinus Mite 30,000 AU/ML H (W/F in mm): 8/11 (02/23/21 1140)  D. Farinae Mite 30,000 AU/ML H (W/F in mm: 4/6 (02/23/21 1140)  Grasses  Grass Mix " #4 10,000 BAU/ML H: 0 (02/23/21 1140)  Christiano Grass 1:20 H (W/F in mm): 0 (02/23/21 1140)  Cockroach  Cockroach Mix 1:10 H (W/F in mm): 0 (02/23/21 1140)  Molds/Fungi  Alternaria Tenuis 1:10 H (W/F in mm): 9/12 (02/23/21 1140)  Aspergillus Fumigatus 1:10 H (W/F in mm): 0 (02/23/21 1140)  Homodendrum Cladosporioides 1:10 H (W/F in mm): 0 (02/23/21 1140)  Penicillin Notatum 1:10 H (W/F in mm): 0 (02/23/21 1140)  Epicoccum 1:10 H (W/F in mm): 0 (02/23/21 1140)  Weeds  Ragweed, Short 1:20 H (W/F in mm): 0 (02/23/21 1140)  Dock, [place] 1:20 H (W/F in mm): 0 (02/23/21 1140)  Lamb's Quarter 1:20 H (W/F in mm): 0 (02/23/21 1140)  Pigweed, Rough Red Root 1:20 H  (W/F in mm): 0 (02/23/21 1140)  Plantain, English 1:20 H  (W/F in mm): 0 (02/23/21 1140)  Sagebrush, Mugwort 1:20 H  (W/F in mm): 0 (02/23/21 1140)  Animal  Cat 10,000 BAU/ML H (W/F in mm): 12/22 (02/23/21 1140)  AP Dog Hair/Dander 1:100: 15/26 (02/23/21 1140)  Controls  Neg. control: 50% Glycerine/Saline H (W/F in mm): 0/0 (02/23/21 1140)  Pos. control: Histamine 6mg/ML (W/F in mms): 8/10 (02/23/21 1140)        Impression report and plan:  1.  Severe atopic dermatitis    SCORAD 94.25.  Reviewed sensitive skin care tips, recommended bleach baths as well as wet wraps.  Suggested a trial of Robathol bath oil.  Reviewed environmental control.  Recommended montelukast.  Cautioned him to the rare side effect of mood disturbance.  He is quite severe today on exam.  For this reason I recommend a trial of Dupixent.  Went over the risks and benefits of this medication.  He has been on oral prednisone.    2.  Allergic rhinitis  3.  Eosinophilic esophagitis    Check specific IgE to foods that typically trigger eosinophilic esophagitis including shrimp and avocado.  Stated that foods do not typically trigger eczema in a patient of this age but this may provide some guidance regarding his eosinophilic esophagitis but stated that testing is inadequate for this disorder.  Went  over the  specific trigger foods.

## 2021-07-04 NOTE — TELEPHONE ENCOUNTER
Telephone Encounter by Kandice Vogt at 6/3/2021 10:07 AM     Author: Kandice Vogt Service: -- Author Type: Financial Resource Guide    Filed: 6/3/2021 10:09 AM Encounter Date: 6/3/2021 Status: Signed    : Kandice Vogt (Financial Resource Guide)       PA Initiation  Medication: Dupixent 300mg/2ml  QTY: 2 pens for 28 days  Insurance Company: [employer] ([employer]  Pharmacy Filing Rx: [facility]  Filling Pharmacy Phone: na  Filling Pharmacy Fax: na  Start Date: 6/3/21  Additional Information: proactive renewal

## 2021-07-04 NOTE — ADDENDUM NOTE
Addendum Note by Robinson Sainz at 2/23/2021 10:30 AM     Author: Robinson Sainz Service: -- Author Type:     Filed: 2/24/2021 12:07 PM Encounter Date: 2/23/2021 Status: Signed    : Robinson Sainz ()    Addended by: ROBINSON SAINZ on: 2/24/2021 12:07 PM        Modules accepted: Orders

## 2021-07-06 VITALS — OXYGEN SATURATION: 98 % | HEART RATE: 103 BPM | WEIGHT: 159 LBS | HEIGHT: 70 IN | BODY MASS INDEX: 22.76 KG/M2

## 2021-10-10 ENCOUNTER — HEALTH MAINTENANCE LETTER (OUTPATIENT)
Age: 31
End: 2021-10-10

## 2021-11-29 ENCOUNTER — TELEPHONE (OUTPATIENT)
Dept: ALLERGY | Facility: CLINIC | Age: 31
End: 2021-11-29

## 2021-11-29 DIAGNOSIS — J30.89 ALLERGIC RHINITIS DUE TO DUST MITE: ICD-10-CM

## 2021-11-29 DIAGNOSIS — J30.89 ALLERGIC RHINITIS CAUSED BY MOLD: Primary | ICD-10-CM

## 2021-11-29 DIAGNOSIS — J30.81 ALLERGIC RHINITIS DUE TO ANIMALS: ICD-10-CM

## 2021-11-29 PROCEDURE — 95165 ANTIGEN THERAPY SERVICES: CPT | Performed by: ALLERGY & IMMUNOLOGY

## 2021-11-29 NOTE — TELEPHONE ENCOUNTER
Dr. Ennis,  This patient want to start an allergy shot. He is currently in DUPIXENT. He want's to talk to you, how the process.  Dylan # [phone]     Thank you.

## 2021-12-06 ENCOUNTER — TELEPHONE (OUTPATIENT)
Dept: ALLERGY | Facility: CLINIC | Age: 31
End: 2021-12-06
Payer: COMMERCIAL

## 2021-12-06 NOTE — TELEPHONE ENCOUNTER
Waiting for phone call on plan of action for his Dupixent and allergy shots.  Please call him.  He is anxious.  thanks

## 2021-12-10 ENCOUNTER — TELEPHONE (OUTPATIENT)
Dept: ALLERGY | Facility: CLINIC | Age: 31
End: 2021-12-10
Payer: COMMERCIAL

## 2021-12-10 NOTE — TELEPHONE ENCOUNTER
LM to [facility] and get 6 shot only appts followed by Follow-up with Dr Ennis. Told him he would be into January

## 2022-01-21 ENCOUNTER — OFFICE VISIT (OUTPATIENT)
Dept: ALLERGY | Facility: CLINIC | Age: 32
End: 2022-01-21
Payer: COMMERCIAL

## 2022-01-21 VITALS — OXYGEN SATURATION: 98 % | RESPIRATION RATE: 16 BRPM | HEART RATE: 102 BPM

## 2022-01-21 DIAGNOSIS — J30.89 NON-SEASONAL ALLERGIC RHINITIS DUE TO OTHER ALLERGIC TRIGGER: ICD-10-CM

## 2022-01-21 DIAGNOSIS — L20.89 OTHER ATOPIC DERMATITIS: Primary | ICD-10-CM

## 2022-01-21 DIAGNOSIS — J30.89 NON-SEASONAL ALLERGIC RHINITIS DUE TO FUNGAL SPORES: ICD-10-CM

## 2022-01-21 DIAGNOSIS — L30.8 OTHER ECZEMA: ICD-10-CM

## 2022-01-21 PROCEDURE — 99213 OFFICE O/P EST LOW 20 MIN: CPT | Performed by: ALLERGY & IMMUNOLOGY

## 2022-01-21 RX ORDER — EPINEPHRINE 0.3 MG/.3ML
INJECTION SUBCUTANEOUS
Qty: 2 EACH | Refills: 0 | Status: SHIPPED | OUTPATIENT
Start: 2022-01-21

## 2022-01-21 RX ORDER — DUPILUMAB 300 MG/2ML
300 INJECTION, SOLUTION SUBCUTANEOUS
Qty: 4 ML | Refills: 4 | Status: SHIPPED | OUTPATIENT
Start: 2022-01-21 | End: 2022-09-08

## 2022-01-21 RX ORDER — MULTIVITAMIN,THERAPEUTIC
1 TABLET ORAL DAILY
COMMUNITY

## 2022-01-21 NOTE — LETTER
1/21/2022         RE: Dylan Neal  [address] [facility] [address]        Dear Colleague,    Thank you for referring your patient, Dylan Neal, to the [facility]. Please see a copy of my visit note below.          Subjective       HPI     Chief complaint: Follow-up eczema    History of present illness: This is a pleasant 32-year-old gentleman who is currently on Dupixent 300 mg every 2 weeks.  He reports is doing well for his eczema.  He has noted a little bit of irritation last 2 weeks but he has not had a dose of Dupixent for almost 3 weeks now.  He has difficulties with his pen on Sunday and states that he is not sure he is able to use it as he dropped it.  He is reaching out to his pharmacy to see if it is still usable.  He reports is working well though for his eczema.  He would like to start allergy shots.  He is here today to discuss that.  We talked about this previously.  He is hoping that he can wean down on Dupixent and use allergy shots only for his symptoms.    Review of Systems         Objective    Pulse 102   Resp 16   SpO2 98%   There is no height or weight on file to calculate BMI.  Physical Exam         Gen: Pleasant male not in acute distress  HEENT: Eyes erythema of the bulbar on the left  no edema. Ears: TMs well visualized, no effusions.       Skin: Eczema on the hands  Psych: Alert and oriented times 3    impression report and plan:    1.  Atopic dermatitis  2.  Eye irritation  3.  Allergic rhinitis    I did go over the risk and benefits of starting allergy shots.  I went over the risks and benefits of allergy shots.  I stated risks include hives, swelling, shortness of breath.  I did state that one in 2.5 million shot administrations can result in death.  I stated they must wait in the office for 30 minutes following the shot and carry an epinephrine device on the day of the shot.  I stated that shots are effective in about 90% of  patients.  I stated that they should check with the insurance company prior to proceeding.  They understand the risks and benefits and agreed to proceed.  We scheduled him today.  He will continue Dupixent for now but if his eye irritation worsens, I would like him to see ophthalmology.  I stated this may limit him on Dupixent.  Given that he is having symptoms today and he has not had Dupixent for 3 weeks, I am not sure that this can be attributed.  Follow during allergy shots.      Again, thank you for allowing me to participate in the care of your patient.        Sincerely,        Rica OLGUIN MD

## 2022-01-21 NOTE — PROGRESS NOTES
Subjective       HPI     Chief complaint: Follow-up eczema    History of present illness: This is a pleasant 32-year-old gentleman who is currently on Dupixent 300 mg every 2 weeks.  He reports is doing well for his eczema.  He has noted a little bit of irritation last 2 weeks but he has not had a dose of Dupixent for almost 3 weeks now.  He has difficulties with his pen on Sunday and states that he is not sure he is able to use it as he dropped it.  He is reaching out to his pharmacy to see if it is still usable.  He reports is working well though for his eczema.  He would like to start allergy shots.  He is here today to discuss that.  We talked about this previously.  He is hoping that he can wean down on Dupixent and use allergy shots only for his symptoms.    Review of Systems         Objective    Pulse 102   Resp 16   SpO2 98%   There is no height or weight on file to calculate BMI.  Physical Exam         Gen: Pleasant male not in acute distress  HEENT: Eyes erythema of the bulbar on the left  no edema. Ears: TMs well visualized, no effusions.       Skin: Eczema on the hands  Psych: Alert and oriented times 3    impression report and plan:    1.  Atopic dermatitis  2.  Eye irritation  3.  Allergic rhinitis    I did go over the risk and benefits of starting allergy shots.  I went over the risks and benefits of allergy shots.  I stated risks include hives, swelling, shortness of breath.  I did state that one in 2.5 million shot administrations can result in death.  I stated they must wait in the office for 30 minutes following the shot and carry an epinephrine device on the day of the shot.  I stated that shots are effective in about 90% of patients.  I stated that they should check with the insurance company prior to proceeding.  They understand the risks and benefits and agreed to proceed.  We scheduled him today.  He will continue Dupixent for now but if his eye irritation worsens, I would like him to  see ophthalmology.  I stated this may limit him on Dupixent.  Given that he is having symptoms today and he has not had Dupixent for 3 weeks, I am not sure that this can be attributed.  Follow during allergy shots.

## 2022-02-01 DIAGNOSIS — J30.81 ALLERGIC RHINITIS DUE TO ANIMALS: Primary | ICD-10-CM

## 2022-02-01 PROCEDURE — 95165 ANTIGEN THERAPY SERVICES: CPT | Performed by: ALLERGY & IMMUNOLOGY

## 2022-02-01 NOTE — PROGRESS NOTES
CAT  1:1000 V/V EXP 4/1/2022  1:100 V/V EXP 6/1/2022  1:10 V/V EXP 2/1/2023  1:1 V/V EXP 2/1/2023    CHECKED BY RG  CHARGED 30 UNITS

## 2022-02-07 ENCOUNTER — ALLIED HEALTH/NURSE VISIT (OUTPATIENT)
Dept: ALLERGY | Facility: CLINIC | Age: 32
End: 2022-02-07
Payer: COMMERCIAL

## 2022-02-07 DIAGNOSIS — J30.89 ALLERGIC RHINITIS DUE TO DUST MITE: ICD-10-CM

## 2022-02-07 DIAGNOSIS — J30.89 NON-SEASONAL ALLERGIC RHINITIS DUE TO FUNGAL SPORES: ICD-10-CM

## 2022-02-07 DIAGNOSIS — J30.81 ALLERGIC RHINITIS DUE TO ANIMALS: Primary | ICD-10-CM

## 2022-02-07 PROCEDURE — 95117 IMMUNOTHERAPY INJECTIONS: CPT

## 2022-02-11 ENCOUNTER — ALLIED HEALTH/NURSE VISIT (OUTPATIENT)
Dept: ALLERGY | Facility: CLINIC | Age: 32
End: 2022-02-11
Payer: COMMERCIAL

## 2022-02-11 DIAGNOSIS — J30.89 NON-SEASONAL ALLERGIC RHINITIS DUE TO FUNGAL SPORES: ICD-10-CM

## 2022-02-11 DIAGNOSIS — J30.81 ALLERGIC RHINITIS DUE TO ANIMALS: Primary | ICD-10-CM

## 2022-02-11 DIAGNOSIS — J30.89 ALLERGIC RHINITIS DUE TO DUST MITE: ICD-10-CM

## 2022-02-11 PROCEDURE — 95117 IMMUNOTHERAPY INJECTIONS: CPT

## 2022-02-14 ENCOUNTER — ALLIED HEALTH/NURSE VISIT (OUTPATIENT)
Dept: ALLERGY | Facility: CLINIC | Age: 32
End: 2022-02-14
Payer: COMMERCIAL

## 2022-02-14 DIAGNOSIS — J30.89 ALLERGIC RHINITIS CAUSED BY MOLD: ICD-10-CM

## 2022-02-14 DIAGNOSIS — J30.81 ALLERGIC RHINITIS DUE TO ANIMALS: Primary | ICD-10-CM

## 2022-02-14 DIAGNOSIS — J30.89 ALLERGIC RHINITIS DUE TO DUST MITE: ICD-10-CM

## 2022-02-14 PROCEDURE — 95117 IMMUNOTHERAPY INJECTIONS: CPT

## 2022-02-18 ENCOUNTER — ALLIED HEALTH/NURSE VISIT (OUTPATIENT)
Dept: ALLERGY | Facility: CLINIC | Age: 32
End: 2022-02-18
Payer: COMMERCIAL

## 2022-02-18 DIAGNOSIS — J30.89 ALLERGIC RHINITIS DUE TO DUST MITE: ICD-10-CM

## 2022-02-18 DIAGNOSIS — J30.81 ALLERGIC RHINITIS DUE TO ANIMALS: Primary | ICD-10-CM

## 2022-02-18 DIAGNOSIS — J30.89 ALLERGIC RHINITIS CAUSED BY MOLD: ICD-10-CM

## 2022-02-18 PROCEDURE — 95117 IMMUNOTHERAPY INJECTIONS: CPT

## 2022-02-23 ENCOUNTER — HOSPITAL ENCOUNTER (EMERGENCY)
Facility: CLINIC | Age: 32
Discharge: HOME | End: 2022-02-23
Attending: EMERGENCY MEDICINE | Admitting: EMERGENCY MEDICINE
Payer: COMMERCIAL

## 2022-02-23 ENCOUNTER — TELEPHONE (OUTPATIENT)
Dept: UROLOGY | Facility: CLINIC | Age: 32
End: 2022-02-23

## 2022-02-23 ENCOUNTER — APPOINTMENT (OUTPATIENT)
Dept: CT IMAGING | Facility: CLINIC | Age: 32
End: 2022-02-23
Attending: EMERGENCY MEDICINE
Payer: COMMERCIAL

## 2022-02-23 VITALS
DIASTOLIC BLOOD PRESSURE: 90 MMHG | OXYGEN SATURATION: 99 % | WEIGHT: 159 LBS | HEART RATE: 78 BPM | TEMPERATURE: 97.6 F | RESPIRATION RATE: 14 BRPM | BODY MASS INDEX: 22.81 KG/M2 | SYSTOLIC BLOOD PRESSURE: 151 MMHG

## 2022-02-23 DIAGNOSIS — N20.1 RIGHT URETERAL STONE: ICD-10-CM

## 2022-02-23 DIAGNOSIS — N20.0 RENAL STONE: ICD-10-CM

## 2022-02-23 DIAGNOSIS — K44.9 HIATAL HERNIA: ICD-10-CM

## 2022-02-23 DIAGNOSIS — N23 RENAL COLIC: ICD-10-CM

## 2022-02-23 LAB
ALBUMIN UR-MCNC: 30 MG/DL
ANION GAP SERPL CALCULATED.3IONS-SCNC: 12 MMOL/L (ref 5–18)
APPEARANCE UR: CLEAR
BASOPHILS # BLD AUTO: 0 10E3/UL (ref 0–0.2)
BASOPHILS NFR BLD AUTO: 1 %
BILIRUB UR QL STRIP: NEGATIVE
BUN SERPL-MCNC: 12 MG/DL (ref 8–22)
CALCIUM SERPL-MCNC: 9.3 MG/DL (ref 8.5–10.5)
CHLORIDE BLD-SCNC: 104 MMOL/L (ref 98–107)
CO2 SERPL-SCNC: 25 MMOL/L (ref 22–31)
COLOR UR AUTO: YELLOW
CREAT SERPL-MCNC: 1.12 MG/DL (ref 0.7–1.3)
EOSINOPHIL # BLD AUTO: 0.2 10E3/UL (ref 0–0.7)
EOSINOPHIL NFR BLD AUTO: 3 %
ERYTHROCYTE [DISTWIDTH] IN BLOOD BY AUTOMATED COUNT: 11.5 % (ref 10–15)
GFR SERPL CREATININE-BSD FRML MDRD: 88 ML/MIN/1.73M2
GLUCOSE BLD-MCNC: 124 MG/DL (ref 70–125)
GLUCOSE UR STRIP-MCNC: NEGATIVE MG/DL
HCT VFR BLD AUTO: 43.1 % (ref 40–53)
HGB BLD-MCNC: 15.1 G/DL (ref 13.3–17.7)
HGB UR QL STRIP: ABNORMAL
IMM GRANULOCYTES # BLD: 0 10E3/UL
IMM GRANULOCYTES NFR BLD: 0 %
KETONES UR STRIP-MCNC: NEGATIVE MG/DL
LEUKOCYTE ESTERASE UR QL STRIP: NEGATIVE
LYMPHOCYTES # BLD AUTO: 1.4 10E3/UL (ref 0.8–5.3)
LYMPHOCYTES NFR BLD AUTO: 32 %
MCH RBC QN AUTO: 30.5 PG (ref 26.5–33)
MCHC RBC AUTO-ENTMCNC: 35 G/DL (ref 31.5–36.5)
MCV RBC AUTO: 87 FL (ref 78–100)
MONOCYTES # BLD AUTO: 0.5 10E3/UL (ref 0–1.3)
MONOCYTES NFR BLD AUTO: 11 %
MUCOUS THREADS #/AREA URNS LPF: PRESENT /LPF
NEUTROPHILS # BLD AUTO: 2.3 10E3/UL (ref 1.6–8.3)
NEUTROPHILS NFR BLD AUTO: 53 %
NITRATE UR QL: NEGATIVE
NRBC # BLD AUTO: 0 10E3/UL
NRBC BLD AUTO-RTO: 0 /100
PH UR STRIP: 6 [PH] (ref 5–7)
PLATELET # BLD AUTO: 308 10E3/UL (ref 150–450)
POTASSIUM BLD-SCNC: 4.5 MMOL/L (ref 3.5–5)
RBC # BLD AUTO: 4.95 10E6/UL (ref 4.4–5.9)
RBC URINE: 43 /HPF
SODIUM SERPL-SCNC: 141 MMOL/L (ref 136–145)
SP GR UR STRIP: 1.03 (ref 1–1.03)
SQUAMOUS EPITHELIAL: <1 /HPF
UROBILINOGEN UR STRIP-MCNC: <2 MG/DL
WBC # BLD AUTO: 4.4 10E3/UL (ref 4–11)
WBC URINE: 4 /HPF

## 2022-02-23 PROCEDURE — 250N000011 HC RX IP 250 OP 636: Performed by: EMERGENCY MEDICINE

## 2022-02-23 PROCEDURE — 258N000003 HC RX IP 258 OP 636: Performed by: EMERGENCY MEDICINE

## 2022-02-23 PROCEDURE — 36415 COLL VENOUS BLD VENIPUNCTURE: CPT | Performed by: EMERGENCY MEDICINE

## 2022-02-23 PROCEDURE — 96374 THER/PROPH/DIAG INJ IV PUSH: CPT | Mod: 59

## 2022-02-23 PROCEDURE — 96375 TX/PRO/DX INJ NEW DRUG ADDON: CPT

## 2022-02-23 PROCEDURE — 250N000013 HC RX MED GY IP 250 OP 250 PS 637: Performed by: EMERGENCY MEDICINE

## 2022-02-23 PROCEDURE — 85025 COMPLETE CBC W/AUTO DIFF WBC: CPT | Performed by: EMERGENCY MEDICINE

## 2022-02-23 PROCEDURE — 96361 HYDRATE IV INFUSION ADD-ON: CPT

## 2022-02-23 PROCEDURE — 99285 EMERGENCY DEPT VISIT HI MDM: CPT | Mod: 25

## 2022-02-23 PROCEDURE — 81001 URINALYSIS AUTO W/SCOPE: CPT | Performed by: EMERGENCY MEDICINE

## 2022-02-23 PROCEDURE — 80048 BASIC METABOLIC PNL TOTAL CA: CPT | Performed by: EMERGENCY MEDICINE

## 2022-02-23 PROCEDURE — 74177 CT ABD & PELVIS W/CONTRAST: CPT

## 2022-02-23 RX ORDER — IOPAMIDOL 755 MG/ML
100 INJECTION, SOLUTION INTRAVASCULAR ONCE
Status: COMPLETED | OUTPATIENT
Start: 2022-02-23 | End: 2022-02-23

## 2022-02-23 RX ORDER — DIMENHYDRINATE 50 MG
50 TABLET ORAL AT BEDTIME
Qty: 7 TABLET | Refills: 0 | Status: SHIPPED | OUTPATIENT
Start: 2022-02-23 | End: 2022-03-02

## 2022-02-23 RX ORDER — HYDROMORPHONE HYDROCHLORIDE 1 MG/ML
0.5 INJECTION, SOLUTION INTRAMUSCULAR; INTRAVENOUS; SUBCUTANEOUS ONCE
Status: DISCONTINUED | OUTPATIENT
Start: 2022-02-23 | End: 2022-02-23

## 2022-02-23 RX ORDER — DIMENHYDRINATE 50 MG
50 TABLET ORAL EVERY 6 HOURS PRN
Qty: 28 TABLET | Refills: 0 | Status: SHIPPED | OUTPATIENT
Start: 2022-02-23 | End: 2022-03-02

## 2022-02-23 RX ORDER — ACETAMINOPHEN 325 MG/1
650 TABLET ORAL ONCE
Status: COMPLETED | OUTPATIENT
Start: 2022-02-23 | End: 2022-02-23

## 2022-02-23 RX ORDER — KETOROLAC TROMETHAMINE 15 MG/ML
15 INJECTION, SOLUTION INTRAMUSCULAR; INTRAVENOUS ONCE
Status: COMPLETED | OUTPATIENT
Start: 2022-02-23 | End: 2022-02-23

## 2022-02-23 RX ORDER — ACETAMINOPHEN 500 MG
1000 TABLET ORAL EVERY 6 HOURS
Qty: 56 TABLET | Refills: 0 | Status: SHIPPED | OUTPATIENT
Start: 2022-02-23 | End: 2022-03-02

## 2022-02-23 RX ORDER — ONDANSETRON 2 MG/ML
4 INJECTION INTRAMUSCULAR; INTRAVENOUS ONCE
Status: COMPLETED | OUTPATIENT
Start: 2022-02-23 | End: 2022-02-23

## 2022-02-23 RX ORDER — IBUPROFEN 200 MG
400 TABLET ORAL EVERY 6 HOURS
Qty: 56 TABLET | Refills: 0 | Status: SHIPPED | OUTPATIENT
Start: 2022-02-23 | End: 2022-03-02

## 2022-02-23 RX ADMIN — IOPAMIDOL 100 ML: 755 INJECTION, SOLUTION INTRAVENOUS at 09:01

## 2022-02-23 RX ADMIN — ONDANSETRON 4 MG: 2 INJECTION INTRAMUSCULAR; INTRAVENOUS at 08:33

## 2022-02-23 RX ADMIN — SODIUM CHLORIDE 1000 ML: 9 INJECTION, SOLUTION INTRAVENOUS at 08:34

## 2022-02-23 RX ADMIN — Medication 50 MG: at 10:41

## 2022-02-23 RX ADMIN — ACETAMINOPHEN 650 MG: 325 TABLET, FILM COATED ORAL at 10:41

## 2022-02-23 RX ADMIN — KETOROLAC TROMETHAMINE 15 MG: 15 INJECTION, SOLUTION INTRAMUSCULAR; INTRAVENOUS at 08:34

## 2022-02-23 ASSESSMENT — ENCOUNTER SYMPTOMS: BACK PAIN: 1

## 2022-02-23 NOTE — ED TRIAGE NOTES
Pt presents to the ED with c/o of right side flank pain that radiates to the groin that started at 0600 today. Pt has hx of kidney stones about 10 ago.

## 2022-02-23 NOTE — ED PROVIDER NOTES
EMERGENCY DEPARTMENT ENCOUNTER      NAME: Dylan Neal  AGE: 32 year old male  YOB: 1990  MRN: [id number]  EVALUATION DATE & TIME: 2/23/2022  8:12 AM    PCP: Viridiana Lopez    ED PROVIDER: Kirsty Ayala M.D.      CHIEF COMPLAINT     Chief Complaint   Patient presents with     Flank Pain         FINAL IMPRESSION:     1. Renal colic    2. Right ureteral stone    3. Hiatal hernia    4. Renal stone          MEDICAL DECISION MAKING:       Pertinent Labs & Imaging studies reviewed. (See chart for details)    32 year old male presents to the Emergency Department for evaluation of flank pain.     ED Course as of 02/23/22 1303   Wed Feb 23, 2022   0820 31 yo male presents complaint of 1 week of double right-sided flank pain progressively worse until today.  Today he feels a going active right inguinal area.  Similar to previous pain when he had a kidney stone.   0820 No testicular or penile pain.   0821 No surgical history.  On examination he appears in pain.  Abdomen is soft no pulsatile masses 2+ femoral pulses.  With chaperone circumcised male no inguinal hernia.   0821 Differential diagnoses include but not limited to renal colic dissection appendicitis perforation PE among others.   0822 We will start an IV.  We will do Toradol Zofran fluids.   0912 UA mucus 43 red blood cells negative there is no dysarthria normal renal function.   0913 Reevaluated. Patient states after my initial evaluation the pain was coming in waves and then it resolved. He went to provide a urine sample but no strainer was given but did not feel like he passed a stone. Currently comfortable. Likely stone is in the bladder or pass. Awaiting CT.   0914 Specific Gravity Urine: 1.028   1005 CT does show a 5 mm stone located in the right mid ureter. Patient pain is significantly improved with only Toradol but he is concerned about going home and the pain returning. He reports rash with Percocet. Reviewed history with  pharmacist.    1023 Patient appears comfortable.  State he is having some difficulty urinating.  But states pain is well controlled.  We will do Tylenol which she had tolerated before Dramamine given water prior to discharge.   1056 Reevaluated able to urinate feels comfortable being discharged.  So far patient has had Toradol Tylenol and Dramamine.  Was therefore will continue regimen.   1104 Patient was aware of the other kidney stone on the right kidney and a hiatal hernia he had an EGD done in the past.  He feels comfortable going home all questions answered.   1105 Clinical impression and decision making 32-year-old male presents complaining of right flank pain radiating to the right side.  Previous history of kidney stones.  Nontoxic on examination.  CT does reveal 5 x 4 x 3 mm stone in the right mid ureter.  Also has a right kidney stone.  Incidental finding of hiatal hernia.  No evidence of any tract infection normal renal function.  Was given Toradol Tylenol might be which significantly improved his pain is currently a 1 out of 10.  Previous   1105 Rash with hydrocodone.  Given good control of pain with Toradol will continue ibuprofen and Tylenol Dramamine and follow up with kidney stone [place].  Patient feels comfortable Discharge Ambulatory Stable Condition.       Vital Signs: Hypertension  EKG: None  Imaging: CT  Home Meds: Reviewed  ED meds/abx: Toradol zofran  Fluids: Normal saline    Labs  K 4.5  Cr 1.12  Wbc 4.4  Hgb 15.1  Platelets 308        Review of Previous Records  Per chart review, the patient presented to this ED on 12/12/2015 for flank pain and was found to have a Ureteral Stone.  Labs and CT reviewed.  CT with multiple stones present and stone which appeared to have passed into bladder. The patient given dilaudid and zofran for his pain with prescription for percocet and zofran. The patient reported feeling better.  No signs of infection.  Referral to [facility].  Discharged with return  precautions.       Consults      ED COURSE     8:16 AM I met with the patient and performed my initial exam.  I discussed the plan for care and the patient is agreeable with this plan. PPE: Provider wore gloves, N95 mask, eye protection, surgical cap.    8:40 AM I rechecked and updated the patient.     9:14 AM reevaluated and updated    10:57 AM I rechecked and updated the patient. I discussed the plan for discharge with the patient, and patient is agreeable. We discussed supportivecares at home and reasons for return to the ER including new or worsening symptoms - all questions and concerns addressed. Patient to be discharged by RN.     At the conclusion of the encounter I discussed the results of all of the tests and the disposition. The questions were answered. The patient acknowledged understanding and was agreeable with the care plan.           MEDICATIONS GIVEN IN THE EMERGENCY:     Medications   ketorolac (TORADOL) injection 15 mg (15 mg Intravenous Given 2/23/22 0834)   ondansetron (ZOFRAN) injection 4 mg (4 mg Intravenous Given 2/23/22 0833)   0.9% sodium chloride BOLUS (0 mLs Intravenous Stopped 2/23/22 1041)   iopamidol (ISOVUE-370) solution 100 mL (100 mLs Intravenous Given 2/23/22 0901)   acetaminophen (TYLENOL) tablet 650 mg (650 mg Oral Given 2/23/22 1041)   dimenhyDRINATE chew tab 50 mg (50 mg Oral Given 2/23/22 1041)       NEW PRESCRIPTIONS STARTED AT TODAY'S ER VISIT     Discharge Medication List as of 2/23/2022 11:19 AM      START taking these medications    Details   acetaminophen (TYLENOL) 500 MG tablet Take 2 tablets (1,000 mg) by mouth every 6 hours for 7 days, Disp-56 tablet, R-0, Local PrintHas tolerated acetaminophen before      !! dimenhyDRINATE (DRAMAMINE) 50 MG tablet Take 1 tablet (50 mg) by mouth At Bedtime for 7 days, Disp-7 tablet, R-0, Local Print      !! dimenhyDRINATE (DRAMAMINE) 50 MG tablet Take 1 tablet (50 mg) by mouth every 6 hours as needed for other (kidney stone pain  management), Disp-28 tablet, R-0, Local Print      ibuprofen (ADVIL/MOTRIN) 200 MG tablet Take 2 tablets (400 mg) by mouth every 6 hours for 7 days, Disp-56 tablet, R-0, Local Print       !! - Potential duplicate medications found. Please discuss with provider.             =================================================================    HPI     Patient information was obtained from: the patient    Use of : N/A      Dylan Neal is a 32 year old year old male with a pertinent medical history of MEERA, hyperlipidemia, ureteral stone, allergies who presents to the ED by personal vehicle for the evaluation of flank pain.    The patient presents today endorsing right lower back pain.  He has had a dull ache in the area for ~1-1.5 weeks but this AM at ~0600, his pain became sharp and radiates to his groin area.  He also notes a dull ache in his penis and testicles.      The patient denies any other symptoms at this time.     MHx: History of kidney stones and the patient reports he was treated with morphine last time he had stones.  He is allergic to percocet and eggs. He is COVID-19 vaccinated.  He notes allergies.    SHx: Occasionally drinks.       REVIEW OF SYSTEMS   Review of Systems   Genitourinary: Positive for penile swelling and testicular pain.        Positive for groin pain.    Musculoskeletal: Positive for back pain (right lower back - ache, now shooting).   All other systems reviewed and are negative.       PAST MEDICAL HISTORY:     Past Medical History:   Diagnosis Date     Backache     Created by Conversion  Replacement Utility updated for latest IMO load     Esophageal reflux     Created by Conversion      Ureteral stone 12/12/2015       PAST SURGICAL HISTORY:     Past Surgical History:   Procedure Laterality Date     OTHER SURGICAL HISTORY      none         CURRENT MEDICATIONS:   acetaminophen (TYLENOL) 500 MG tablet  dimenhyDRINATE (DRAMAMINE) 50 MG tablet  dimenhyDRINATE (DRAMAMINE) 50  MG tablet  ibuprofen (ADVIL/MOTRIN) 200 MG tablet  cetirizine (ZYRTEC) 10 MG tablet  Dupilumab (DUPIXENT) 300 MG/2ML SOPN  EPINEPHrine (ANY BX GENERIC EQUIV) 0.3 MG/0.3ML injection 2-pack  esomeprazole (NEXIUM) 20 MG capsule  hydrOXYzine HCL (ATARAX) 25 MG tablet  Lactobacillus rhamnosus GG (CULTURELLE) 15 billion cell CpSP  MEDICATION CANNOT BE REORDERED - PLEASE MANUALLY REORDER AND DISCONTINUE THE OLD ORDER  multivitamin, therapeutic (THERA-VIT) TABS tablet  ORDER FOR ALLERGEN IMMUNOTHERAPY         ALLERGIES:     Allergies   Allergen Reactions     Other Environmental Allergy Unknown     DUST     Percocet [Oxycodone-Acetaminophen] Itching     Face itching.     Pollen Extracts [Pollen Extract] Unknown       FAMILY HISTORY:     Family History   Problem Relation Age of Onset     Hypertension Father      Hyperlipidemia Father      Cancer Maternal Grandmother         lung from heavy smoking     Heart Disease Maternal Grandfather      Urolithiasis Paternal Grandfather         recurrent x2     Heart Disease Paternal Grandfather      Coronary Artery Disease Paternal Grandfather      Diabetes Cousin        SOCIAL HISTORY:     Social History     Socioeconomic History     Marital status:      Spouse name: Not on file     Number of children: Not on file     Years of education: 14     Highest education level: Not on file   Occupational History     Not on file   Tobacco Use     Smoking status: Former Smoker     Types: Cigarettes, Cigarettes     Smokeless tobacco: Never Used   Substance and Sexual Activity     Alcohol use: Yes     Drug use: No     Sexual activity: Yes     Partners: Female   Other Topics Concern     Not on file   Social History Narrative     Not on file     Social Determinants of Health     Financial Resource Strain: Not on file   Food Insecurity: Not on file   Transportation Needs: Not on file   Physical Activity: Not on file   Stress: Not on file   Social Connections: Not on file   Intimate Partner  Violence: Not on file   Housing Stability: Not on file       VITALS:   BP (!) 151/90   Pulse 78   Temp 97.6  F (36.4  C) (Temporal)   Resp 14   Wt 72.1 kg (159 lb)   SpO2 99%   BMI 22.81 kg/m      PHYSICAL EXAM     Physical Exam  Vitals and nursing note reviewed. Exam conducted with a chaperone present.   Constitutional:       Appearance: Normal appearance. He is not ill-appearing, toxic-appearing or diaphoretic.      Comments: Appears in pain.   Genitourinary:     Penis: Normal.       Testes: Normal.      Comments: 2+ femoral pulses.  Chaperone present.  Neurological:      Mental Status: He is alert.         Physical Exam   Constitutional: Non toxic appears in pain.     Head: Atraumatic.     Nose: Nose normal.     Mouth/Throat: Oropharynx is clear and moist.     Eyes: EOM are normal. Pupils are equal, round, and reactive to light.     Ears: Pearly whites bilaterally TMs intact.     Neck: Normal range of motion. Neck supple.     Cardiovascular: Normal rate, regular rhythm and normal heart sounds.      Pulmonary/Chest: Normal effort  and breath sounds normal.     Abdominal: Location of pain is in the right lower quadrant but is not reproducible.    Musculoskeletal: Normal range of motion. Right flank tenderness.    Neurological: No focal deficits    Lymphatics: No BLE edema.     : 2+ femoral pulses. circumcised male bilateral descended testis.  Chaperone present.    Skin: Skin is warm and dry.     Psychiatric: Normal mood and affect. Behavior is normal.       LAB:     All pertinent labs reviewed and interpreted.  Labs Ordered and Resulted from Time of ED Arrival to Time of ED Departure   ROUTINE UA WITH MICROSCOPIC REFLEX TO CULTURE - Abnormal       Result Value    Color Urine Yellow      Appearance Urine Clear      Glucose Urine Negative      Bilirubin Urine Negative      Ketones Urine Negative      Specific Gravity Urine 1.028      Blood Urine 1.0 mg/dL (*)     pH Urine 6.0      Protein Albumin Urine 30   (*)     Urobilinogen Urine <2.0      Nitrite Urine Negative      Leukocyte Esterase Urine Negative      Mucus Urine Present (*)     RBC Urine 43 (*)     WBC Urine 4      Squamous Epithelials Urine <1     BASIC METABOLIC PANEL - Normal    Sodium 141      Potassium 4.5      Chloride 104      Carbon Dioxide (CO2) 25      Anion Gap 12      Urea Nitrogen 12      Creatinine 1.12      Calcium 9.3      Glucose 124      GFR Estimate 88     CBC WITH PLATELETS AND DIFFERENTIAL    WBC Count 4.4      RBC Count 4.95      Hemoglobin 15.1      Hematocrit 43.1      MCV 87      MCH 30.5      MCHC 35.0      RDW 11.5      Platelet Count 308      % Neutrophils 53      % Lymphocytes 32      % Monocytes 11      % Eosinophils 3      % Basophils 1      % Immature Granulocytes 0      NRBCs per 100 WBC 0      Absolute Neutrophils 2.3      Absolute Lymphocytes 1.4      Absolute Monocytes 0.5      Absolute Eosinophils 0.2      Absolute Basophils 0.0      Absolute Immature Granulocytes 0.0      Absolute NRBCs 0.0          RADIOLOGY:     Reviewed all pertinent imaging. Please see official radiology report.  CT Abdomen Pelvis w Contrast   Final Result   IMPRESSION:    1.  Mildly obstructing 5 x 4 x 3 mm stone mid right ureter at the pelvic brim.   2.  A 2 mm nonobstructing stone lower pole right kidney.   3.  Kidneys, ureters and bladder otherwise normal.   4.  Small esophageal hiatal hernia.            EKG:       I have independently reviewed and interpreted the EKG(s) documented above.      PROCEDURES:     Procedures      I, Minor Jackson, am serving as a scribe to document services personally performed by Dr. Ayala based on my observation and the provider's statements to me. I, Kirsty Ayala MD attest that Minor Jackson is acting in a scribe capacity, has observed my performance of the services and has documented them in accordance with my direction.    Kirsty Ayala M.D.  Emergency Medicine  [facility]  [facility] EMERGENCY ROOM  [address] [address]  [phone]  Dept: [phone]     Kirsty Ayala MD  02/23/22 9122

## 2022-02-23 NOTE — ED NOTES
Pt reports that pain is relieved after medication given, denies nausea, awaiting CT, call light in reach.

## 2022-02-23 NOTE — TELEPHONE ENCOUNTER
Left message to pt stating the uro clinic has an appt to offer based on a referral.     Writer offering video appt with aMry Kate Huntley on 2/25/2022.

## 2022-02-23 NOTE — TELEPHONE ENCOUNTER
[facility] Call Center    Phone Message    May a detailed message be left on voicemail: yes     Reason for Call: Appointment Intake    Referring Provider Name: Kirsty Ayala MD  Diagnosis and/or Symptoms: Renal colic, Right ureteral stone    This referral came through as emergency 1-2 days    Action Taken: Message routed to:  [facility] Urology    Travel Screening: Not Applicable

## 2022-02-23 NOTE — DISCHARGE INSTRUCTIONS
Read and follow the discharge instructions.    You have a stone at the ureter about to go into the bladder.  You also have a stone on the right kidney that is not moving.    There was an incidental finding of a hernia    I am referring you to the kidney stone [place] [facility] receiving a calls soon for follow-up    You may take Tylenol, Motrin with food, and Dramamine as instructed.    Do not drive or do anything that requires coordination while taking Dramamine.    Return immediately if your pain is not well controlled or you have fever vomiting or any other concerns.

## 2022-03-04 ENCOUNTER — ALLIED HEALTH/NURSE VISIT (OUTPATIENT)
Dept: ALLERGY | Facility: CLINIC | Age: 32
End: 2022-03-04
Payer: COMMERCIAL

## 2022-03-04 DIAGNOSIS — J30.89 ALLERGIC RHINITIS DUE TO DUST MITE: ICD-10-CM

## 2022-03-04 DIAGNOSIS — J30.89 ALLERGIC RHINITIS CAUSED BY MOLD: ICD-10-CM

## 2022-03-04 DIAGNOSIS — J30.81 ALLERGIC RHINITIS DUE TO ANIMALS: Primary | ICD-10-CM

## 2022-03-04 PROCEDURE — 95117 IMMUNOTHERAPY INJECTIONS: CPT

## 2022-03-07 ENCOUNTER — ALLIED HEALTH/NURSE VISIT (OUTPATIENT)
Dept: ALLERGY | Facility: CLINIC | Age: 32
End: 2022-03-07
Payer: COMMERCIAL

## 2022-03-07 DIAGNOSIS — J30.89 ALLERGIC RHINITIS CAUSED BY MOLD: ICD-10-CM

## 2022-03-07 DIAGNOSIS — J30.81 ALLERGIC RHINITIS DUE TO ANIMALS: Primary | ICD-10-CM

## 2022-03-07 DIAGNOSIS — J30.89 ALLERGIC RHINITIS DUE TO DUST MITE: ICD-10-CM

## 2022-03-07 PROCEDURE — 95117 IMMUNOTHERAPY INJECTIONS: CPT

## 2022-03-07 PROCEDURE — 99207 PR DROP WITH A PROCEDURE: CPT

## 2022-03-09 ENCOUNTER — IMMUNIZATION (OUTPATIENT)
Dept: NURSING | Facility: CLINIC | Age: 32
End: 2022-03-09
Payer: COMMERCIAL

## 2022-03-09 PROCEDURE — 0054A COVID-19,PF,PFIZER (12+ YRS): CPT

## 2022-03-09 PROCEDURE — 91305 COVID-19,PF,PFIZER (12+ YRS): CPT

## 2022-03-18 ENCOUNTER — OFFICE VISIT (OUTPATIENT)
Dept: ALLERGY | Facility: CLINIC | Age: 32
End: 2022-03-18
Payer: COMMERCIAL

## 2022-03-18 VITALS — HEIGHT: 70 IN | WEIGHT: 159 LBS | RESPIRATION RATE: 16 BRPM | BODY MASS INDEX: 22.76 KG/M2

## 2022-03-18 DIAGNOSIS — J30.81 ALLERGIC RHINITIS DUE TO ANIMALS: Primary | ICD-10-CM

## 2022-03-18 DIAGNOSIS — J30.89 ALLERGIC RHINITIS CAUSED BY MOLD: ICD-10-CM

## 2022-03-18 DIAGNOSIS — J30.89 ALLERGIC RHINITIS DUE TO DUST MITE: ICD-10-CM

## 2022-03-18 PROCEDURE — 95117 IMMUNOTHERAPY INJECTIONS: CPT | Performed by: ALLERGY & IMMUNOLOGY

## 2022-03-18 PROCEDURE — 99213 OFFICE O/P EST LOW 20 MIN: CPT | Mod: 25 | Performed by: ALLERGY & IMMUNOLOGY

## 2022-03-18 NOTE — PROGRESS NOTES
"      Subjective       HPI     Chief complaint: Follow-up allergies and eczema  History of present illness: This is a pleasant 32-year-old gentleman with a history of allergies and asthma here today for follow-up visit.  He is currently on Dupixent 300 mg every 2 weeks as well as allergy shots.  He is moving into his blue vial.  Has had no systemic reactions but a small site reaction.  Overall he feels better.  He has a cat at home and he is allergic to the cat.  He feels better around the cat.  He has been started Dupixent a little bit and he reports that he washes his hands a lot and eczema seems to bothering him some on the hands.  Dupixent does seem to control these symptoms.    Review of Systems         Objective    Resp 16   Ht 1.778 m (5' 10\")   Wt 72.1 kg (159 lb)   BMI 22.81 kg/m    Body mass index is 22.81 kg/m .  Physical Exam      Gen: Pleasant male not in acute distress  HEENT: Eyes no erythema of the bulbar or palpebral conjunctiva, no edema.   Skin: Eczema on the palmar surfaces of the hands  Psych: Alert and oriented times 3    Impression report and plan:  1.  Allergic rhinitis  2.  Atopic dermatitis    Continue allergy shots.  Continue current medication therapy.  Notify of systemic reaction.  Follow in 6 weeks.    "

## 2022-03-18 NOTE — LETTER
"    3/18/2022         RE: Dylan Neal  [address] [facility] [address]        Dear Colleague,    Thank you for referring your patient, Dylan Neal, to the [facility]. Please see a copy of my visit note below.          Subjective       HPI     Chief complaint: Follow-up allergies and eczema  History of present illness: This is a pleasant 32-year-old gentleman with a history of allergies and asthma here today for follow-up visit.  He is currently on Dupixent 300 mg every 2 weeks as well as allergy shots.  He is moving into his blue vial.  Has had no systemic reactions but a small site reaction.  Overall he feels better.  He has a cat at home and he is allergic to the cat.  He feels better around the cat.  He has been started Dupixent a little bit and he reports that he washes his hands a lot and eczema seems to bothering him some on the hands.  Dupixent does seem to control these symptoms.    Review of Systems         Objective    Resp 16   Ht 1.778 m (5' 10\")   Wt 72.1 kg (159 lb)   BMI 22.81 kg/m    Body mass index is 22.81 kg/m .  Physical Exam      Gen: Pleasant male not in acute distress  HEENT: Eyes no erythema of the bulbar or palpebral conjunctiva, no edema.   Skin: Eczema on the palmar surfaces of the hands  Psych: Alert and oriented times 3    Impression report and plan:  1.  Allergic rhinitis  2.  Atopic dermatitis    Continue allergy shots.  Continue current medication therapy.  Notify of systemic reaction.  Follow in 6 weeks.        Again, thank you for allowing me to participate in the care of your patient.        Sincerely,        Rica OLGUIN MD    "

## 2022-03-25 ENCOUNTER — ALLIED HEALTH/NURSE VISIT (OUTPATIENT)
Dept: ALLERGY | Facility: CLINIC | Age: 32
End: 2022-03-25
Payer: COMMERCIAL

## 2022-03-25 DIAGNOSIS — J30.89 ALLERGIC RHINITIS CAUSED BY MOLD: ICD-10-CM

## 2022-03-25 DIAGNOSIS — J30.89 ALLERGIC RHINITIS DUE TO DUST MITE: ICD-10-CM

## 2022-03-25 DIAGNOSIS — J30.81 ALLERGIC RHINITIS DUE TO ANIMALS: Primary | ICD-10-CM

## 2022-03-25 PROCEDURE — 95117 IMMUNOTHERAPY INJECTIONS: CPT

## 2022-03-28 ENCOUNTER — ALLIED HEALTH/NURSE VISIT (OUTPATIENT)
Dept: ALLERGY | Facility: CLINIC | Age: 32
End: 2022-03-28
Payer: COMMERCIAL

## 2022-03-28 DIAGNOSIS — J30.81 ALLERGIC RHINITIS DUE TO ANIMALS: Primary | ICD-10-CM

## 2022-03-28 DIAGNOSIS — J30.89 ALLERGIC RHINITIS DUE TO DUST MITE: ICD-10-CM

## 2022-03-28 PROCEDURE — 95117 IMMUNOTHERAPY INJECTIONS: CPT

## 2022-04-01 ENCOUNTER — ALLIED HEALTH/NURSE VISIT (OUTPATIENT)
Dept: ALLERGY | Facility: CLINIC | Age: 32
End: 2022-04-01
Payer: COMMERCIAL

## 2022-04-01 DIAGNOSIS — J30.81 ALLERGIC RHINITIS DUE TO ANIMALS: Primary | ICD-10-CM

## 2022-04-01 DIAGNOSIS — J30.89 ALLERGIC RHINITIS CAUSED BY MOLD: ICD-10-CM

## 2022-04-01 DIAGNOSIS — J30.89 ALLERGIC RHINITIS DUE TO DUST MITE: ICD-10-CM

## 2022-04-01 PROCEDURE — 95117 IMMUNOTHERAPY INJECTIONS: CPT

## 2022-04-08 ENCOUNTER — ALLIED HEALTH/NURSE VISIT (OUTPATIENT)
Dept: ALLERGY | Facility: CLINIC | Age: 32
End: 2022-04-08
Payer: COMMERCIAL

## 2022-04-08 DIAGNOSIS — J30.89 ALLERGIC RHINITIS DUE TO DUST MITE: ICD-10-CM

## 2022-04-08 DIAGNOSIS — J30.81 ALLERGIC RHINITIS DUE TO ANIMALS: Primary | ICD-10-CM

## 2022-04-08 DIAGNOSIS — J30.89 ALLERGIC RHINITIS CAUSED BY MOLD: ICD-10-CM

## 2022-04-08 PROCEDURE — 95117 IMMUNOTHERAPY INJECTIONS: CPT

## 2022-04-11 ENCOUNTER — OFFICE VISIT (OUTPATIENT)
Dept: ALLERGY | Facility: CLINIC | Age: 32
End: 2022-04-11
Payer: COMMERCIAL

## 2022-04-11 VITALS — HEART RATE: 86 BPM | OXYGEN SATURATION: 100 % | WEIGHT: 159 LBS | BODY MASS INDEX: 22.81 KG/M2

## 2022-04-11 DIAGNOSIS — J30.89 ALLERGIC RHINITIS CAUSED BY MOLD: ICD-10-CM

## 2022-04-11 DIAGNOSIS — L30.8 OTHER ECZEMA: ICD-10-CM

## 2022-04-11 DIAGNOSIS — J30.81 ALLERGIC RHINITIS DUE TO ANIMALS: Primary | ICD-10-CM

## 2022-04-11 DIAGNOSIS — J30.89 ALLERGIC RHINITIS DUE TO DUST MITE: ICD-10-CM

## 2022-04-11 PROCEDURE — 99213 OFFICE O/P EST LOW 20 MIN: CPT | Mod: 25 | Performed by: ALLERGY & IMMUNOLOGY

## 2022-04-11 PROCEDURE — 95117 IMMUNOTHERAPY INJECTIONS: CPT | Performed by: ALLERGY & IMMUNOLOGY

## 2022-04-11 NOTE — LETTER
4/11/2022         RE: Dylan Neal  [address] [facility] [facility] [address]        Dear Colleague,    Thank you for referring your patient, Dylan Neal, to the [facility]. Please see a copy of my visit note below.        Subjective       HPI     Chief complaint: Follow-up allergies    History of present illness: This is a pleasant 32-year-old gentleman with a history of eczema and allergies here today for follow-up visit.  He is moving to his yellow vial.  He has had some site reactions but no systemic reactions.  He is Dupixent and he has been able to space out to 300 mg monthly.  He still has some dry skin on his hands.  Overall his allergy symptoms otherwise are unchanged.    Review of Systems         Objective    Pulse 86   Wt 72.1 kg (159 lb)   SpO2 100%   BMI 22.81 kg/m    Body mass index is 22.81 kg/m .  Physical Exam     Gen: Pleasant male not in acute distress  HEENT: Eyes no erythema of the bulbar or palpebral conjunctiva, no edema.   Skin: No rashes or lesions  Psych: Alert and oriented times 3    Impression report and plan:  1.  Allergic rhinitis    2.  Eczema    Continue Dupixent 300 mg monthly.  Continue allergy shots.  Continue current medication therapy.  Notify of systemic reaction.  Follow in 6 weeks.        Again, thank you for allowing me to participate in the care of your patient.        Sincerely,        Rica OLGUIN MD

## 2022-04-11 NOTE — PROGRESS NOTES
Subjective       HPI     Chief complaint: Follow-up allergies    History of present illness: This is a pleasant 32-year-old gentleman with a history of eczema and allergies here today for follow-up visit.  He is moving to his yellow vial.  He has had some site reactions but no systemic reactions.  He is Dupixent and he has been able to space out to 300 mg monthly.  He still has some dry skin on his hands.  Overall his allergy symptoms otherwise are unchanged.    Review of Systems         Objective    Pulse 86   Wt 72.1 kg (159 lb)   SpO2 100%   BMI 22.81 kg/m    Body mass index is 22.81 kg/m .  Physical Exam     Gen: Pleasant male not in acute distress  HEENT: Eyes no erythema of the bulbar or palpebral conjunctiva, no edema.   Skin: No rashes or lesions  Psych: Alert and oriented times 3    Impression report and plan:  1.  Allergic rhinitis    2.  Eczema    Continue Dupixent 300 mg monthly.  Continue allergy shots.  Continue current medication therapy.  Notify of systemic reaction.  Follow in 6 weeks.

## 2022-05-06 ENCOUNTER — TELEPHONE (OUTPATIENT)
Dept: ALLERGY | Facility: CLINIC | Age: 32
End: 2022-05-06
Payer: COMMERCIAL

## 2022-05-06 NOTE — TELEPHONE ENCOUNTER
PA Initiation    Medication: Dupixent - PA Pending  Insurance Company: [employer] - Phone [phone] Fax [phone]  Pharmacy Filling the Rx: [facility] - [address] [place] [address]  Filling Pharmacy Phone:    Filling Pharmacy Fax:    Start Date: 5/6/2022    [facility] Key: [id number]

## 2022-05-09 ENCOUNTER — ALLIED HEALTH/NURSE VISIT (OUTPATIENT)
Dept: ALLERGY | Facility: CLINIC | Age: 32
End: 2022-05-09
Payer: COMMERCIAL

## 2022-05-09 DIAGNOSIS — J30.81 ALLERGIC RHINITIS DUE TO ANIMALS: Primary | ICD-10-CM

## 2022-05-09 DIAGNOSIS — J30.89 ALLERGIC RHINITIS DUE TO DUST MITE: ICD-10-CM

## 2022-05-09 PROCEDURE — 95117 IMMUNOTHERAPY INJECTIONS: CPT

## 2022-05-10 NOTE — TELEPHONE ENCOUNTER
Prior Authorization Approval    Authorization Effective Date: 4/6/2022  Authorization Expiration Date: 5/8/2023  Medication: Dupixent - Approved  Approved Dose/Quantity: 4ml per 28 days  Reference #: CMM Key: [id number] - Case ID: [id number]  Insurance Company: [employer] - Phone [phone] Fax [phone]  Expected CoPay: Unknown     CoPay Card Available: Yes    Foundation Assistance Needed:    Which Pharmacy is filling the prescription (Not needed for infusion/clinic administered): [facility] - [address] [place] [address]  Pharmacy Notified: Renewal  Patient Notified: Renewal

## 2022-05-20 ENCOUNTER — ALLIED HEALTH/NURSE VISIT (OUTPATIENT)
Dept: ALLERGY | Facility: CLINIC | Age: 32
End: 2022-05-20
Payer: COMMERCIAL

## 2022-05-20 DIAGNOSIS — J30.81 ALLERGIC RHINITIS DUE TO ANIMALS: Primary | ICD-10-CM

## 2022-05-20 PROCEDURE — 95117 IMMUNOTHERAPY INJECTIONS: CPT

## 2022-05-23 ENCOUNTER — ALLIED HEALTH/NURSE VISIT (OUTPATIENT)
Dept: ALLERGY | Facility: CLINIC | Age: 32
End: 2022-05-23
Payer: COMMERCIAL

## 2022-05-23 DIAGNOSIS — J30.81 ALLERGIC RHINITIS DUE TO ANIMALS: Primary | ICD-10-CM

## 2022-05-23 PROCEDURE — 95117 IMMUNOTHERAPY INJECTIONS: CPT

## 2022-05-25 NOTE — TELEPHONE ENCOUNTER
Pt states this rx needs to sent to [facility] for ins to cover Telephone Encounter by Kandice Vogt at 6/4/2021  1:02 PM     Author: Kandice Vogt Service: -- Author Type: Financial Resource Guide    Filed: 6/4/2021  1:57 PM Encounter Date: 6/3/2021 Status: Signed    : Kandice Vogt (Financial Resource Guide)       Prior Authorization Approval  Medication: Dupixent 300mg/2ml  Qty: 2 pens for 28 days  Effective Dates: 5/4/21 - 6/4/22  Reference Number: na  Insurance Company: [employer]  Pharmacy: [facility]  Expected Copay: not covered at this location  Copay Card Available: Yes  Foundation Assistance Needed/Available: no  Patient Assistance Program Needed: no  Patient Notified? yes  Pharmacy Notified? Yes

## 2022-06-06 ENCOUNTER — ALLIED HEALTH/NURSE VISIT (OUTPATIENT)
Dept: ALLERGY | Facility: CLINIC | Age: 32
End: 2022-06-06
Payer: COMMERCIAL

## 2022-06-06 DIAGNOSIS — J30.81 ALLERGIC RHINITIS DUE TO ANIMALS: Primary | ICD-10-CM

## 2022-06-06 PROCEDURE — 95117 IMMUNOTHERAPY INJECTIONS: CPT

## 2022-06-13 ENCOUNTER — ALLIED HEALTH/NURSE VISIT (OUTPATIENT)
Dept: ALLERGY | Facility: CLINIC | Age: 32
End: 2022-06-13
Payer: COMMERCIAL

## 2022-06-13 DIAGNOSIS — J30.89 ALLERGIC RHINITIS CAUSED BY MOLD: ICD-10-CM

## 2022-06-13 DIAGNOSIS — J30.81 ALLERGIC RHINITIS DUE TO ANIMALS: Primary | ICD-10-CM

## 2022-06-13 DIAGNOSIS — J30.89 ALLERGIC RHINITIS DUE TO DUST MITE: ICD-10-CM

## 2022-06-13 PROCEDURE — 95117 IMMUNOTHERAPY INJECTIONS: CPT

## 2022-06-24 ENCOUNTER — OFFICE VISIT (OUTPATIENT)
Dept: ALLERGY | Facility: CLINIC | Age: 32
End: 2022-06-24
Payer: COMMERCIAL

## 2022-06-24 VITALS — BODY MASS INDEX: 22.81 KG/M2 | OXYGEN SATURATION: 94 % | HEART RATE: 89 BPM | WEIGHT: 159 LBS

## 2022-06-24 DIAGNOSIS — J30.89 ALLERGIC RHINITIS CAUSED BY MOLD: ICD-10-CM

## 2022-06-24 DIAGNOSIS — J30.89 ALLERGIC RHINITIS DUE TO DUST MITE: ICD-10-CM

## 2022-06-24 DIAGNOSIS — J02.9 SORE THROAT: ICD-10-CM

## 2022-06-24 DIAGNOSIS — L20.89 OTHER ATOPIC DERMATITIS: ICD-10-CM

## 2022-06-24 DIAGNOSIS — J30.81 ALLERGIC RHINITIS DUE TO ANIMALS: Primary | ICD-10-CM

## 2022-06-24 PROCEDURE — 99214 OFFICE O/P EST MOD 30 MIN: CPT | Mod: 25 | Performed by: ALLERGY & IMMUNOLOGY

## 2022-06-24 PROCEDURE — 95117 IMMUNOTHERAPY INJECTIONS: CPT | Performed by: ALLERGY & IMMUNOLOGY

## 2022-06-24 RX ORDER — AZELASTINE 1 MG/ML
2 SPRAY, METERED NASAL 2 TIMES DAILY
Qty: 30 ML | Refills: 1 | Status: SHIPPED | OUTPATIENT
Start: 2022-06-24 | End: 2024-08-09

## 2022-06-24 NOTE — PROGRESS NOTES
Subjective   Dylan is a 32 year old, presenting for the following health issues:  Allergy Injection and RECHECK      HPI     Chief complaint: Follow-up allergies    History of present illness: This is a pleasant 32-year-old gentleman who is here today for follow-up of allergies.  Currently he is undergoing allergen immunotherapy moving his red vial.  Overall his allergy symptoms have improved.  He is currently taking Dupixent for his eczema.  He is using it every 2-3 weeks and eczema has been well controlled.  He wonders if the allergy shots are affecting his eczema as well.  He does note some soreness of his larynx and this is unusual for him.  He states he talks a lot for his job and wonders if there is something wrong with the larynx.  He states it feels very sore especially on palpation.  He feels a small lump on 1 side of the larynx as well.  He states this happened when his eczema was flared.  He has had no systemic reactions with allergy shots.    Review of Systems         Objective    Pulse 89   Wt 72.1 kg (159 lb)   SpO2 94%   BMI 22.81 kg/m    Body mass index is 22.81 kg/m .  Physical Exam   Gen: Pleasant male not in acute distress  HEENT: Eyes no erythema of the bulbar or palpebral conjunctiva, no edema. Ears: TMs well visualized, no effusions. Nose: No congestion, mucosa normal. Mouth: Throat clear, no lip or tongue edema.   Neck: Some pain with palpation of the larynx, small palpable lymph nodes on the left side of his larynx, mobile  Respiratory: Clear to auscultation bilaterally, no adventitious breath sounds    Skin: No rashes or lesions  Psych: Alert and oriented times 3    Impression report and plan:  1.  Allergic rhinitis  2.  Eczema  3.  Hoarseness, throat pain    Given his concerns, recommended evaluation with ENT.  Suspect this could be secondary to drainage so I recommended Astelin nasal spray.  Okay to continue Dupixent.  Follow in 6 months and continue allergy shots and notify of  systemic reaction.                .  ..

## 2022-06-24 NOTE — LETTER
6/24/2022         RE: Dylan Neal  [address] [place] [address]        Dear Colleague,    Thank you for referring your patient, Dylan Neal, to the [facility]. Please see a copy of my visit note below.          Subjective   Dylan is a 32 year old, presenting for the following health issues:  Allergy Injection and RECHECK      HPI     Chief complaint: Follow-up allergies    History of present illness: This is a pleasant 32-year-old gentleman who is here today for follow-up of allergies.  Currently he is undergoing allergen immunotherapy moving his red vial.  Overall his allergy symptoms have improved.  He is currently taking Dupixent for his eczema.  He is using it every 2-3 weeks and eczema has been well controlled.  He wonders if the allergy shots are affecting his eczema as well.  He does note some soreness of his larynx and this is unusual for him.  He states he talks a lot for his job and wonders if there is something wrong with the larynx.  He states it feels very sore especially on palpation.  He feels a small lump on 1 side of the larynx as well.  He states this happened when his eczema was flared.  He has had no systemic reactions with allergy shots.    Review of Systems         Objective    Pulse 89   Wt 72.1 kg (159 lb)   SpO2 94%   BMI 22.81 kg/m    Body mass index is 22.81 kg/m .  Physical Exam   Gen: Pleasant male not in acute distress  HEENT: Eyes no erythema of the bulbar or palpebral conjunctiva, no edema. Ears: TMs well visualized, no effusions. Nose: No congestion, mucosa normal. Mouth: Throat clear, no lip or tongue edema.   Neck: Some pain with palpation of the larynx, small palpable lymph nodes on the left side of his larynx, mobile  Respiratory: Clear to auscultation bilaterally, no adventitious breath sounds    Skin: No rashes or lesions  Psych: Alert and oriented times 3    Impression report and plan:  1.  Allergic rhinitis  2.   Eczema  3.  Hoarseness, throat pain    Given his concerns, recommended evaluation with ENT.  Suspect this could be secondary to drainage so I recommended Astelin nasal spray.  Okay to continue Dupixent.  Follow in 6 months and continue allergy shots and notify of systemic reaction.                .  ..      Again, thank you for allowing me to participate in the care of your patient.        Sincerely,        Rica OLGUIN MD

## 2022-07-17 ENCOUNTER — HEALTH MAINTENANCE LETTER (OUTPATIENT)
Age: 32
End: 2022-07-17

## 2022-08-22 ENCOUNTER — TELEPHONE (OUTPATIENT)
Dept: ALLERGY | Facility: CLINIC | Age: 32
End: 2022-08-22

## 2022-08-22 NOTE — TELEPHONE ENCOUNTER
Writer tried to called pt for first time in order to schedule his shot at [facility]. Pt's Voicemail is not available.     Hernandez Marie MA

## 2022-08-22 NOTE — TELEPHONE ENCOUNTER
Reason for Call:  Other appointment    Detailed comments: Patient is usually seen in the [facility] for allergy shots but patient moved and the [place] clinic is closer to his house so he would like an appointment if possible in [place] for allergy shots as soon as possible. Patient wants a call back to see what he would need to do to transfer over.    Phone Number Patient can be reached at: Home number on file [phone] (home)    Best Time: Anytime    Can we leave a detailed message on this number? YES    Call taken on 8/22/2022 at 12:09 PM by Kayy Reyes

## 2022-08-26 ENCOUNTER — ALLIED HEALTH/NURSE VISIT (OUTPATIENT)
Dept: ALLERGY | Facility: CLINIC | Age: 32
End: 2022-08-26
Payer: COMMERCIAL

## 2022-08-26 ENCOUNTER — TELEPHONE (OUTPATIENT)
Dept: ALLERGY | Facility: CLINIC | Age: 32
End: 2022-08-26

## 2022-08-26 DIAGNOSIS — J30.2 SEASONAL ALLERGIC RHINITIS: Primary | ICD-10-CM

## 2022-08-26 PROCEDURE — 99207 PR NO CHARGE NURSE ONLY: CPT

## 2022-08-26 NOTE — PROGRESS NOTES
No charge for this visit. Patient was unable to receive allergy injections today due to yellow vials not being sent with red vials. Per Dr. Ennis, patient was cutback to 0.3mL yellow. Last dose charted was 0.5mL yellow. Will contact Dr. Ennis and her team regarding this situation and reach back out to patient to schedule when vials arrive.    Li Solares, JALILN, RN

## 2022-08-26 NOTE — TELEPHONE ENCOUNTER
Name Of Person Who Called: Hernandez whitten (Medical Assistant - [facility])    Reason for call: Hernandez ALEXANDRE from [facility] call regarding serum for JENNIFER.      Best Phone Number To Call Back: Other phone number: [phone]  Okay To Leave A Detailed Voicemail? Yes

## 2022-08-29 ENCOUNTER — TELEPHONE (OUTPATIENT)
Dept: ALLERGY | Facility: CLINIC | Age: 32
End: 2022-08-29

## 2022-08-29 NOTE — TELEPHONE ENCOUNTER
Writer tried to call pt to schedule his shots at [facility]. Writer was unable to leave a voicemail. Writer will try to call the pt later today.    Hernandez Marie MA

## 2022-09-01 NOTE — TELEPHONE ENCOUNTER
Writer left a voicemail with his direct line phone number [phone] in order to schedule his first shot's appointment at [facility].      Hernandez Marie MA

## 2022-09-01 NOTE — TELEPHONE ENCOUNTER
Left third voicemail to patient for patient to call back and schedule allergy shots appointment.       Hernandez Marie MA

## 2022-09-08 DIAGNOSIS — L30.8 OTHER ECZEMA: ICD-10-CM

## 2022-09-08 RX ORDER — DUPILUMAB 300 MG/2ML
INJECTION, SOLUTION SUBCUTANEOUS
Qty: 8 ML | Refills: 3 | Status: SHIPPED | OUTPATIENT
Start: 2022-09-08 | End: 2023-02-03

## 2022-09-20 ENCOUNTER — TELEPHONE (OUTPATIENT)
Dept: ALLERGY | Facility: CLINIC | Age: 32
End: 2022-09-20

## 2022-09-20 ENCOUNTER — ALLIED HEALTH/NURSE VISIT (OUTPATIENT)
Dept: ALLERGY | Facility: CLINIC | Age: 32
End: 2022-09-20
Payer: COMMERCIAL

## 2022-09-20 DIAGNOSIS — J30.9 ALLERGIC RHINITIS: ICD-10-CM

## 2022-09-20 DIAGNOSIS — J30.89 ALLERGIC RHINITIS CAUSED BY MOLD: ICD-10-CM

## 2022-09-20 DIAGNOSIS — J30.89 ALLERGIC RHINITIS DUE TO DUST MITE: ICD-10-CM

## 2022-09-20 DIAGNOSIS — J30.81 ALLERGIC RHINITIS DUE TO ANIMALS: Primary | ICD-10-CM

## 2022-09-20 PROCEDURE — 95117 IMMUNOTHERAPY INJECTIONS: CPT

## 2022-09-20 NOTE — TELEPHONE ENCOUNTER
Name Of Person Who Called: Dylan whitten (patient)    Reason for call: Patient is unhappy with [facility] wondering if he can switch back and what process would be.     Best Phone Number To Call Back: Cell number on file:    Telephone Information:   Mobile [phone]       Okay To Leave A Detailed Voicemail? Yes

## 2022-09-20 NOTE — PROGRESS NOTES
Patient attempted to leave clinic at the 20 minute rebecca. RN in the shot room let patient know he does need to wait the full 30 minutes and be assessed prior to leaving. Notified patient that if he is unable to wait 30 minutes in the clinic he will not be able to receive his shots. After the 30 minutes was up, patient presented to shot room with no reaction to allergy injections. Discharged from clinic.    Julisa YEAGER, RN  SHOBHA Borrego, RN

## 2022-09-22 NOTE — TELEPHONE ENCOUNTER
Left a message for patient explaining the process of getting the serums back to us, WE WILL ACCEPT THEM per DR chavez.     Jania

## 2022-09-24 ENCOUNTER — HEALTH MAINTENANCE LETTER (OUTPATIENT)
Age: 32
End: 2022-09-24

## 2022-09-27 ENCOUNTER — ALLIED HEALTH/NURSE VISIT (OUTPATIENT)
Dept: ALLERGY | Facility: CLINIC | Age: 32
End: 2022-09-27
Payer: COMMERCIAL

## 2022-09-27 DIAGNOSIS — J30.89 ALLERGIC RHINITIS CAUSED BY MOLD: ICD-10-CM

## 2022-09-27 DIAGNOSIS — J30.81 ALLERGIC RHINITIS DUE TO ANIMALS: Primary | ICD-10-CM

## 2022-09-27 DIAGNOSIS — J30.9 ALLERGIC RHINITIS: ICD-10-CM

## 2022-09-27 DIAGNOSIS — J30.89 ALLERGIC RHINITIS DUE TO DUST MITE: ICD-10-CM

## 2022-09-27 PROCEDURE — 95117 IMMUNOTHERAPY INJECTIONS: CPT

## 2022-09-27 NOTE — PROGRESS NOTES
Patient presented after waiting 30 minutes with no reaction to allergy injections. Discharged from clinic.    Patient notified Allergy Staff he would like to transfer back to [place] to receive allergy shots. Will verify with Dr. Ennis and team that this is okay. Will send serums back once verified.     Li Solares, JALILN, RN

## 2022-10-07 ENCOUNTER — TELEPHONE (OUTPATIENT)
Dept: ALLERGY | Facility: CLINIC | Age: 32
End: 2022-10-07

## 2022-10-07 NOTE — TELEPHONE ENCOUNTER
Called and LM his serum is in [facility] now. He can get scheduled for an allergy shot ASAP. Left [phone] to call.

## 2022-10-07 NOTE — TELEPHONE ENCOUNTER
Scheduled on time  at 9 AM. This is the tracking # [id number]. Notify [place] [facility].       Hernandez Marie MA

## 2022-10-14 ENCOUNTER — ALLIED HEALTH/NURSE VISIT (OUTPATIENT)
Dept: ALLERGY | Facility: CLINIC | Age: 32
End: 2022-10-14
Payer: COMMERCIAL

## 2022-10-14 DIAGNOSIS — J30.89 ALLERGIC RHINITIS DUE TO DUST MITE: ICD-10-CM

## 2022-10-14 DIAGNOSIS — J30.81 ALLERGIC RHINITIS DUE TO ANIMALS: Primary | ICD-10-CM

## 2022-10-14 DIAGNOSIS — J30.89 ALLERGIC RHINITIS CAUSED BY MOLD: ICD-10-CM

## 2022-10-14 PROCEDURE — 95117 IMMUNOTHERAPY INJECTIONS: CPT

## 2022-10-14 PROCEDURE — 99207 PR NO CHARGE NURSE ONLY: CPT

## 2022-10-31 ENCOUNTER — ALLIED HEALTH/NURSE VISIT (OUTPATIENT)
Dept: ALLERGY | Facility: CLINIC | Age: 32
End: 2022-10-31
Payer: COMMERCIAL

## 2022-10-31 DIAGNOSIS — J30.89 ALLERGIC RHINITIS CAUSED BY MOLD: ICD-10-CM

## 2022-10-31 DIAGNOSIS — J30.81 ALLERGIC RHINITIS DUE TO ANIMALS: Primary | ICD-10-CM

## 2022-10-31 DIAGNOSIS — J30.89 ALLERGIC RHINITIS DUE TO DUST MITE: ICD-10-CM

## 2022-10-31 PROCEDURE — 99207 PR NO CHARGE NURSE ONLY: CPT

## 2022-10-31 PROCEDURE — 95117 IMMUNOTHERAPY INJECTIONS: CPT

## 2022-11-07 ENCOUNTER — ALLIED HEALTH/NURSE VISIT (OUTPATIENT)
Dept: ALLERGY | Facility: CLINIC | Age: 32
End: 2022-11-07
Payer: COMMERCIAL

## 2022-11-07 DIAGNOSIS — J30.81 ALLERGIC RHINITIS DUE TO ANIMALS: Primary | ICD-10-CM

## 2022-11-07 PROCEDURE — 95117 IMMUNOTHERAPY INJECTIONS: CPT

## 2022-11-07 NOTE — PROGRESS NOTES
Dylan Neal presents to clinic today at the request of Rica Ennis MD (ordering provider) for Allergy Immunotherapy injection(s).       This service provided today was under the care of Rica Ennis MD; the supervising provider of the day; who was available if needed.    Jania Guo

## 2022-11-11 ENCOUNTER — ALLIED HEALTH/NURSE VISIT (OUTPATIENT)
Dept: ALLERGY | Facility: CLINIC | Age: 32
End: 2022-11-11
Payer: COMMERCIAL

## 2022-11-11 DIAGNOSIS — J30.81 ALLERGIC RHINITIS DUE TO ANIMALS: Primary | ICD-10-CM

## 2022-11-11 DIAGNOSIS — J30.89 ALLERGIC RHINITIS DUE TO DUST MITE: ICD-10-CM

## 2022-11-11 DIAGNOSIS — J30.89 ALLERGIC RHINITIS CAUSED BY MOLD: ICD-10-CM

## 2022-11-11 PROCEDURE — 95117 IMMUNOTHERAPY INJECTIONS: CPT

## 2022-11-11 NOTE — PROGRESS NOTES
Dylan Neal presents to clinic today at the request of Rica Ennis MD (ordering provider) for Allergy Immunotherapy injection(s).       This service provided today was under the care of Rica Ennis MD; the supervising provider of the day; who was available if needed.    Faith Ervin RN

## 2022-11-21 ENCOUNTER — ALLIED HEALTH/NURSE VISIT (OUTPATIENT)
Dept: ALLERGY | Facility: CLINIC | Age: 32
End: 2022-11-21
Payer: COMMERCIAL

## 2022-11-21 DIAGNOSIS — J30.89 ALLERGIC RHINITIS CAUSED BY MOLD: ICD-10-CM

## 2022-11-21 DIAGNOSIS — J30.81 ALLERGIC RHINITIS DUE TO ANIMALS: Primary | ICD-10-CM

## 2022-11-21 DIAGNOSIS — J30.89 ALLERGIC RHINITIS DUE TO DUST MITE: ICD-10-CM

## 2022-11-21 PROCEDURE — 95117 IMMUNOTHERAPY INJECTIONS: CPT

## 2023-01-06 ENCOUNTER — TELEPHONE (OUTPATIENT)
Dept: ALLERGY | Facility: CLINIC | Age: 33
End: 2023-01-06

## 2023-01-06 NOTE — TELEPHONE ENCOUNTER
Name Of Person Who Called: Dylan whitten (patient)    Reason for call: patient want to get the allergy shot possible before seeing Dr. Ennis. Annie, give him a call or can I schedule him. He cannot wait until February.      Best Phone Number To Call Back: Home number on file [phone] (home)    Okay To Leave A Detailed Voicemail? Yes

## 2023-01-06 NOTE — TELEPHONE ENCOUNTER
Called and LVM to call back and ask for pk sepulveda julie or osman to help get allergy shots scheduled.

## 2023-01-13 ENCOUNTER — ALLIED HEALTH/NURSE VISIT (OUTPATIENT)
Dept: ALLERGY | Facility: CLINIC | Age: 33
End: 2023-01-13
Payer: COMMERCIAL

## 2023-01-13 DIAGNOSIS — J30.81 ALLERGIC RHINITIS DUE TO ANIMALS: Primary | ICD-10-CM

## 2023-01-13 DIAGNOSIS — J30.89 ALLERGIC RHINITIS DUE TO DUST MITE: ICD-10-CM

## 2023-01-13 DIAGNOSIS — J30.89 ALLERGIC RHINITIS CAUSED BY MOLD: ICD-10-CM

## 2023-01-13 PROCEDURE — 95117 IMMUNOTHERAPY INJECTIONS: CPT

## 2023-01-13 NOTE — PROGRESS NOTES
Dylan PAPI Neal presents to clinic today at the request of Rica Ennis MD (ordering provider) for Allergy Immunotherapy injection(s).       This service provided today was under the care of Rica Ennis MD; the supervising provider of the day; who was available if needed.      Patient presented after waiting 30 minutes with no reaction to  injections. Discharged from clinic.    Faith Ervin RN

## 2023-01-20 ENCOUNTER — ALLIED HEALTH/NURSE VISIT (OUTPATIENT)
Dept: ALLERGY | Facility: CLINIC | Age: 33
End: 2023-01-20
Payer: COMMERCIAL

## 2023-01-20 DIAGNOSIS — J30.81 ALLERGIC RHINITIS DUE TO ANIMALS: Primary | ICD-10-CM

## 2023-01-20 DIAGNOSIS — J30.89 ALLERGIC RHINITIS DUE TO DUST MITE: ICD-10-CM

## 2023-01-20 DIAGNOSIS — J30.89 ALLERGIC RHINITIS CAUSED BY MOLD: ICD-10-CM

## 2023-01-20 PROCEDURE — 95117 IMMUNOTHERAPY INJECTIONS: CPT

## 2023-01-27 ENCOUNTER — ALLIED HEALTH/NURSE VISIT (OUTPATIENT)
Dept: ALLERGY | Facility: CLINIC | Age: 33
End: 2023-01-27
Payer: COMMERCIAL

## 2023-01-27 DIAGNOSIS — J30.89 ALLERGIC RHINITIS CAUSED BY MOLD: ICD-10-CM

## 2023-01-27 DIAGNOSIS — J30.81 ALLERGIC RHINITIS DUE TO ANIMALS: Primary | ICD-10-CM

## 2023-01-27 DIAGNOSIS — J30.89 ALLERGIC RHINITIS DUE TO DUST MITE: ICD-10-CM

## 2023-01-27 PROCEDURE — 95117 IMMUNOTHERAPY INJECTIONS: CPT

## 2023-01-27 NOTE — PROGRESS NOTES
Dylan Neal presents to clinic today at the request of Rica Ennis MD (ordering provider) for Allergy Immunotherapy injection(s).       This service provided today was under the care of Rica Ennis MD; the supervising provider of the day; who was available if needed.      Patient presented after waiting 30 minutes with no reaction to  injections. Discharged from clinic.    April Mallory RN

## 2023-02-03 ENCOUNTER — OFFICE VISIT (OUTPATIENT)
Dept: ALLERGY | Facility: CLINIC | Age: 33
End: 2023-02-03
Payer: COMMERCIAL

## 2023-02-03 VITALS — WEIGHT: 159 LBS | BODY MASS INDEX: 22.81 KG/M2 | OXYGEN SATURATION: 99 % | HEART RATE: 91 BPM

## 2023-02-03 DIAGNOSIS — J30.89 NON-SEASONAL ALLERGIC RHINITIS DUE TO OTHER ALLERGIC TRIGGER: ICD-10-CM

## 2023-02-03 DIAGNOSIS — J30.89 NON-SEASONAL ALLERGIC RHINITIS DUE TO FUNGAL SPORES: Primary | ICD-10-CM

## 2023-02-03 DIAGNOSIS — L30.8 OTHER ECZEMA: ICD-10-CM

## 2023-02-03 PROCEDURE — 99213 OFFICE O/P EST LOW 20 MIN: CPT | Performed by: ALLERGY & IMMUNOLOGY

## 2023-02-03 RX ORDER — DUPILUMAB 300 MG/2ML
300 INJECTION, SOLUTION SUBCUTANEOUS
Qty: 8 ML | Refills: 3 | Status: SHIPPED | OUTPATIENT
Start: 2023-02-03 | End: 2023-09-01

## 2023-02-03 NOTE — PROGRESS NOTES
Subjective   Dylan is a 33 year old, presenting for the following health issues:  RECHECK (Allergy shots)      HPI     Chief complaint: Follow-up allergies    History of present illness: This is a pleasant 33-year-old gentleman here today for follow-up of allergies.  He is currently undergoing allergy immunotherapy.  He has yet to receive his full dose maintenance as he has had difficulty getting his shots regularly.  He does note when he has a lapse in shots his allergy symptoms returned and he has to take an extra allergy pill.  He notes some drainage and congestion.  He is currently on Dupixent which he is taking every 2 to 4 weeks.  He is trying to space this out to monthly.  He takes this for eczema.  Denies any current eye irritation or numbness and tingling in his hands and feet.  He reports that Dupixent seems to be working out every month and controlling his eczema.  He has a small eczematous patch on his left arm today.  No systemic reactions allergy shots.          Objective    Pulse 91   Wt 72.1 kg (159 lb)   SpO2 99%   BMI 22.81 kg/m    Body mass index is 22.81 kg/m .  Physical Exam   Gen: Pleasant male not in acute distress  HEENT: Eyes no erythema of the bulbar or palpebral conjunctiva, no edema.   Skin: Eczema on the left inner forearm  Psych: Alert and oriented times 3    Impression report and plan:  1.  Allergic rhinitis  2.  Atopic dermatitis    Continue Dupixent 300 mg every 2 weeks.  Okay to space out to monthly if he is doing well.  Continue allergy shots and build the full dose maintenance.  If he is doing well on full dose maintenance, could consider trial off of Dupixent.  I stated 1/3rd of patients with eczema have a specific allergic trigger which may improve with allergy shots.  If he is still on Dupixent in 6 months, I would like to see him at that time.  Otherwise he can follow yearly.  Notify of systemic reaction.  Reviewed risks Dupixent including conjunctivitis and  eosinophilic granulomatous polyangiitis.

## 2023-02-03 NOTE — LETTER
2/3/2023         RE: Dylan Neal  [address] [place] [address]        Dear Colleague,    Thank you for referring your patient, Dylan Neal, to the [facility]. Please see a copy of my visit note below.          Subjective   Dylan is a 33 year old, presenting for the following health issues:  RECHECK (Allergy shots)      HPI     Chief complaint: Follow-up allergies    History of present illness: This is a pleasant 33-year-old gentleman here today for follow-up of allergies.  He is currently undergoing allergy immunotherapy.  He has yet to receive his full dose maintenance as he has had difficulty getting his shots regularly.  He does note when he has a lapse in shots his allergy symptoms returned and he has to take an extra allergy pill.  He notes some drainage and congestion.  He is currently on Dupixent which he is taking every 2 to 4 weeks.  He is trying to space this out to monthly.  He takes this for eczema.  Denies any current eye irritation or numbness and tingling in his hands and feet.  He reports that Dupixent seems to be working out every month and controlling his eczema.  He has a small eczematous patch on his left arm today.  No systemic reactions allergy shots.         Objective    Pulse 91   Wt 72.1 kg (159 lb)   SpO2 99%   BMI 22.81 kg/m    Body mass index is 22.81 kg/m .  Physical Exam   Gen: Pleasant male not in acute distress  HEENT: Eyes no erythema of the bulbar or palpebral conjunctiva, no edema.   Skin: Eczema on the left inner forearm  Psych: Alert and oriented times 3    Impression report and plan:  1.  Allergic rhinitis  2.  Atopic dermatitis    Continue Dupixent 300 mg every 2 weeks.  Okay to space out to monthly if he is doing well.  Continue allergy shots and build the full dose maintenance.  If he is doing well on full dose maintenance, could consider trial off of Dupixent.  I stated 1/3rd of patients with eczema have a  specific allergic trigger which may improve with allergy shots.  If he is still on Dupixent in 6 months, I would like to see him at that time.  Otherwise he can follow yearly.  Notify of systemic reaction.  Reviewed risks Dupixent including conjunctivitis and eosinophilic granulomatous polyangiitis.            Again, thank you for allowing me to participate in the care of your patient.        Sincerely,        Rica OLGUIN MD

## 2023-02-09 DIAGNOSIS — J30.89 NON-SEASONAL ALLERGIC RHINITIS DUE TO FUNGAL SPORES: Primary | ICD-10-CM

## 2023-02-09 DIAGNOSIS — J30.89 ALLERGIC RHINITIS DUE TO DUST MITE: ICD-10-CM

## 2023-02-09 DIAGNOSIS — J30.81 ALLERGIC RHINITIS DUE TO ANIMALS: ICD-10-CM

## 2023-02-09 PROCEDURE — 95165 ANTIGEN THERAPY SERVICES: CPT | Performed by: ALLERGY & IMMUNOLOGY

## 2023-02-09 NOTE — PROGRESS NOTES
ALT/DFM/DPM/AP DOG  1:1 V/V EXP 1/31/2024  CAT  1:1 V/V EXP 1/31/2024    CHECKED BY IB  CHARGED 20 UNITS

## 2023-02-10 ENCOUNTER — ALLIED HEALTH/NURSE VISIT (OUTPATIENT)
Dept: ALLERGY | Facility: CLINIC | Age: 33
End: 2023-02-10
Payer: COMMERCIAL

## 2023-02-10 DIAGNOSIS — J30.81 ALLERGIC RHINITIS DUE TO ANIMALS: ICD-10-CM

## 2023-02-10 DIAGNOSIS — J30.89 NON-SEASONAL ALLERGIC RHINITIS DUE TO FUNGAL SPORES: Primary | ICD-10-CM

## 2023-02-10 DIAGNOSIS — J30.89 ALLERGIC RHINITIS DUE TO DUST MITE: ICD-10-CM

## 2023-02-10 DIAGNOSIS — L30.8 OTHER ECZEMA: ICD-10-CM

## 2023-02-10 PROCEDURE — 95117 IMMUNOTHERAPY INJECTIONS: CPT

## 2023-03-03 ENCOUNTER — ALLIED HEALTH/NURSE VISIT (OUTPATIENT)
Dept: ALLERGY | Facility: CLINIC | Age: 33
End: 2023-03-03
Payer: COMMERCIAL

## 2023-03-03 DIAGNOSIS — J30.89 ALLERGIC RHINITIS DUE TO DUST MITE: ICD-10-CM

## 2023-03-03 DIAGNOSIS — J30.89 NON-SEASONAL ALLERGIC RHINITIS DUE TO FUNGAL SPORES: Primary | ICD-10-CM

## 2023-03-03 DIAGNOSIS — J30.81 ALLERGIC RHINITIS DUE TO ANIMALS: ICD-10-CM

## 2023-03-03 PROCEDURE — 95117 IMMUNOTHERAPY INJECTIONS: CPT

## 2023-03-10 ENCOUNTER — ALLIED HEALTH/NURSE VISIT (OUTPATIENT)
Dept: ALLERGY | Facility: CLINIC | Age: 33
End: 2023-03-10
Payer: COMMERCIAL

## 2023-03-10 DIAGNOSIS — J30.89 ALLERGIC RHINITIS DUE TO DUST MITE: ICD-10-CM

## 2023-03-10 DIAGNOSIS — Z91.048 ALLERGY TO MOLD: ICD-10-CM

## 2023-03-10 DIAGNOSIS — J30.81 ALLERGIC RHINITIS DUE TO ANIMALS: Primary | ICD-10-CM

## 2023-03-10 PROCEDURE — 95117 IMMUNOTHERAPY INJECTIONS: CPT

## 2023-03-10 NOTE — PROGRESS NOTES
Dylan Neal presents to clinic today at the request of Rica Ennis MD (ordering provider) for Allergy Immunotherapy injection(s).       This service provided today was under the care of Rikki Pena MD; the supervising provider of the day; who was available if needed.      Patient presented after waiting 30 minutes with no reaction to  injections. Discharged from clinic.    Faith Ervin RN

## 2023-03-17 ENCOUNTER — ALLIED HEALTH/NURSE VISIT (OUTPATIENT)
Dept: ALLERGY | Facility: CLINIC | Age: 33
End: 2023-03-17
Payer: COMMERCIAL

## 2023-03-17 DIAGNOSIS — J30.81 ALLERGIC RHINITIS DUE TO ANIMALS: Primary | ICD-10-CM

## 2023-03-17 DIAGNOSIS — J30.89 ALLERGIC RHINITIS DUE TO DUST MITE: ICD-10-CM

## 2023-03-17 DIAGNOSIS — Z91.048 ALLERGY TO MOLD: ICD-10-CM

## 2023-03-17 PROCEDURE — 95117 IMMUNOTHERAPY INJECTIONS: CPT

## 2023-03-24 ENCOUNTER — ALLIED HEALTH/NURSE VISIT (OUTPATIENT)
Dept: ALLERGY | Facility: CLINIC | Age: 33
End: 2023-03-24
Payer: COMMERCIAL

## 2023-03-24 DIAGNOSIS — J30.81 ALLERGIC RHINITIS DUE TO ANIMALS: Primary | ICD-10-CM

## 2023-03-24 PROCEDURE — 95117 IMMUNOTHERAPY INJECTIONS: CPT

## 2023-03-31 ENCOUNTER — ALLIED HEALTH/NURSE VISIT (OUTPATIENT)
Dept: ALLERGY | Facility: CLINIC | Age: 33
End: 2023-03-31
Payer: COMMERCIAL

## 2023-03-31 DIAGNOSIS — J30.81 ALLERGIC RHINITIS DUE TO ANIMALS: Primary | ICD-10-CM

## 2023-03-31 DIAGNOSIS — Z91.048 ALLERGY TO MOLD: ICD-10-CM

## 2023-03-31 DIAGNOSIS — J30.89 ALLERGIC RHINITIS DUE TO DUST MITE: ICD-10-CM

## 2023-03-31 PROCEDURE — 95117 IMMUNOTHERAPY INJECTIONS: CPT

## 2023-04-07 ENCOUNTER — ALLIED HEALTH/NURSE VISIT (OUTPATIENT)
Dept: ALLERGY | Facility: CLINIC | Age: 33
End: 2023-04-07
Payer: COMMERCIAL

## 2023-04-07 DIAGNOSIS — J30.81 ALLERGIC RHINITIS DUE TO ANIMALS: Primary | ICD-10-CM

## 2023-04-07 PROCEDURE — 95117 IMMUNOTHERAPY INJECTIONS: CPT

## 2023-04-07 NOTE — PROGRESS NOTES
Dylan Neal presents to clinic today at the request of Rica Ennis MD (ordering provider) for Allergy Immunotherapy injection(s).       This service provided today was under the care of Rica Ennis MD; the supervising provider of the day; who was available if needed.    Jania Lund MA

## 2023-04-20 ENCOUNTER — TELEPHONE (OUTPATIENT)
Dept: ALLERGY | Facility: CLINIC | Age: 33
End: 2023-04-20
Payer: COMMERCIAL

## 2023-04-20 NOTE — TELEPHONE ENCOUNTER
Prior Authorization Approval    Authorization Effective Date: 3/21/2023  Authorization Expiration Date: 4/19/2024  Medication: Dupixent reauth  Approved Dose/Quantity: 300mg/2ML  Reference #: : [id number]   Insurance Company: [employer] - Phone [phone] Fax [phone]  Expected CoPay:       CoPay Card Available:      Foundation Assistance Needed:    Which Pharmacy is filling the prescription (Not needed for infusion/clinic administered):    Pharmacy Notified:    Patient Notified:      VIOLETA Noyola, [facility]  Specialty Pharmacy Clinic Liaison     [facility]    [email or web address]     Phone: [phone]  Fax: [phone]

## 2023-04-21 ENCOUNTER — ALLIED HEALTH/NURSE VISIT (OUTPATIENT)
Dept: ALLERGY | Facility: CLINIC | Age: 33
End: 2023-04-21
Payer: COMMERCIAL

## 2023-04-21 DIAGNOSIS — J30.81 ALLERGIC RHINITIS DUE TO ANIMALS: Primary | ICD-10-CM

## 2023-04-21 PROCEDURE — 95117 IMMUNOTHERAPY INJECTIONS: CPT

## 2023-04-28 ENCOUNTER — ALLIED HEALTH/NURSE VISIT (OUTPATIENT)
Dept: ALLERGY | Facility: CLINIC | Age: 33
End: 2023-04-28
Payer: COMMERCIAL

## 2023-04-28 DIAGNOSIS — J30.81 ALLERGIC RHINITIS DUE TO ANIMALS: Primary | ICD-10-CM

## 2023-04-28 PROCEDURE — 95117 IMMUNOTHERAPY INJECTIONS: CPT

## 2023-05-12 ENCOUNTER — ALLIED HEALTH/NURSE VISIT (OUTPATIENT)
Dept: ALLERGY | Facility: CLINIC | Age: 33
End: 2023-05-12
Payer: COMMERCIAL

## 2023-05-12 DIAGNOSIS — J30.89 ALLERGIC RHINITIS DUE TO DUST MITE: ICD-10-CM

## 2023-05-12 DIAGNOSIS — Z91.048 ALLERGY TO MOLD: ICD-10-CM

## 2023-05-12 DIAGNOSIS — J30.81 ALLERGIC RHINITIS DUE TO ANIMALS: Primary | ICD-10-CM

## 2023-05-12 PROCEDURE — 95117 IMMUNOTHERAPY INJECTIONS: CPT

## 2023-06-09 ENCOUNTER — ALLIED HEALTH/NURSE VISIT (OUTPATIENT)
Dept: ALLERGY | Facility: CLINIC | Age: 33
End: 2023-06-09
Payer: COMMERCIAL

## 2023-06-09 DIAGNOSIS — J30.81 ALLERGIC RHINITIS DUE TO ANIMALS: Primary | ICD-10-CM

## 2023-06-09 PROCEDURE — 95117 IMMUNOTHERAPY INJECTIONS: CPT

## 2023-06-09 NOTE — PROGRESS NOTES
Dylan Neal presents to clinic today at the request of Rica Ennis MD (ordering provider) for Allergy Immunotherapy injection(s).       This service provided today was under the care of Rica Ennis MD; the supervising provider of the day; who was available if needed.      Patient presented after waiting 30 minutes with no reaction to  injections. Discharged from clinic.    Paloma Cruz RN

## 2023-06-16 DIAGNOSIS — J30.81 ALLERGIC RHINITIS DUE TO ANIMALS: Primary | ICD-10-CM

## 2023-06-16 DIAGNOSIS — J30.89 ALLERGIC RHINITIS DUE TO DUST MITE: ICD-10-CM

## 2023-06-16 DIAGNOSIS — J30.89 NON-SEASONAL ALLERGIC RHINITIS DUE TO FUNGAL SPORES: ICD-10-CM

## 2023-06-16 PROCEDURE — 95165 ANTIGEN THERAPY SERVICES: CPT | Performed by: ALLERGY & IMMUNOLOGY

## 2023-06-16 NOTE — PROGRESS NOTES
ALT/DFM/DPM/AP DOG  1:1 V/V EXP 6/12/2024  CAT  1:1 V/V EXP 6/12/2024    CHECKED BY IB   CHARGED 20 UNITS

## 2023-07-07 ENCOUNTER — ALLIED HEALTH/NURSE VISIT (OUTPATIENT)
Dept: ALLERGY | Facility: CLINIC | Age: 33
End: 2023-07-07
Payer: COMMERCIAL

## 2023-07-07 DIAGNOSIS — J30.89 NON-SEASONAL ALLERGIC RHINITIS DUE TO FUNGAL SPORES: ICD-10-CM

## 2023-07-07 DIAGNOSIS — J30.81 ALLERGIC RHINITIS DUE TO ANIMALS: Primary | ICD-10-CM

## 2023-07-07 DIAGNOSIS — J30.89 ALLERGIC RHINITIS DUE TO DUST MITE: ICD-10-CM

## 2023-07-07 PROCEDURE — 95117 IMMUNOTHERAPY INJECTIONS: CPT

## 2023-07-21 ENCOUNTER — ALLIED HEALTH/NURSE VISIT (OUTPATIENT)
Dept: ALLERGY | Facility: CLINIC | Age: 33
End: 2023-07-21
Payer: COMMERCIAL

## 2023-07-21 DIAGNOSIS — J30.89 ALLERGIC RHINITIS DUE TO DUST MITE: Primary | ICD-10-CM

## 2023-07-21 PROCEDURE — 95117 IMMUNOTHERAPY INJECTIONS: CPT

## 2023-07-21 NOTE — PROGRESS NOTES
Dylan Neal presents to clinic today at the request of Rica Ennis MD (ordering provider) for Allergy Immunotherapy injection(s).       This service provided today was under the care of Rikki Pena MD; the supervising provider of the day; who was available if needed.      Patient presented after waiting 30 minutes with no reaction to  injections. Discharged from clinic.    Paloma Cruz RN

## 2023-08-05 ENCOUNTER — HEALTH MAINTENANCE LETTER (OUTPATIENT)
Age: 33
End: 2023-08-05

## 2023-09-01 ENCOUNTER — OFFICE VISIT (OUTPATIENT)
Dept: ALLERGY | Facility: CLINIC | Age: 33
End: 2023-09-01
Payer: COMMERCIAL

## 2023-09-01 VITALS — HEIGHT: 70 IN | BODY MASS INDEX: 22.76 KG/M2 | WEIGHT: 159 LBS

## 2023-09-01 DIAGNOSIS — J30.89 NON-SEASONAL ALLERGIC RHINITIS DUE TO FUNGAL SPORES: ICD-10-CM

## 2023-09-01 DIAGNOSIS — J30.81 ALLERGIC RHINITIS DUE TO ANIMALS: ICD-10-CM

## 2023-09-01 DIAGNOSIS — J30.89 NON-SEASONAL ALLERGIC RHINITIS DUE TO OTHER ALLERGIC TRIGGER: ICD-10-CM

## 2023-09-01 DIAGNOSIS — L30.8 OTHER ECZEMA: Primary | ICD-10-CM

## 2023-09-01 PROCEDURE — 95117 IMMUNOTHERAPY INJECTIONS: CPT | Performed by: ALLERGY & IMMUNOLOGY

## 2023-09-01 PROCEDURE — 99213 OFFICE O/P EST LOW 20 MIN: CPT | Mod: 25 | Performed by: ALLERGY & IMMUNOLOGY

## 2023-09-01 RX ORDER — DUPILUMAB 300 MG/2ML
300 INJECTION, SOLUTION SUBCUTANEOUS
Qty: 8 ML | Refills: 3 | Status: SHIPPED | OUTPATIENT
Start: 2023-09-01 | End: 2024-05-17

## 2023-09-01 NOTE — PROGRESS NOTES
"    Subjective   Dylan is a 33 year old, presenting for the following health issues:  Allergy Injection and RECHECK    HPI     Chief complaint: Follow-up Dupixent and allergy shots    History of present illness: This is a pleasant 33-year-old gentleman with a history of atopic dermatitis here today for follow-up visit.  He has not been using his Dupixent regularly and has had no breakthrough symptoms.  He does report the winter that is worse for him and he is worried that his eczema will return if he is not on Dupixent.  Overall he feels allergy shots have improved his symptoms and that this summer he is done well with his eczema and allergy symptoms.  No systemic reactions.  He started maintenance shots in April of this year.            Objective    Ht 1.778 m (5' 10\")   Wt 72.1 kg (159 lb)   BMI 22.81 kg/m    Body mass index is 22.81 kg/m .  Physical Exam      Gen: Pleasant male not in acute distress  HEENT: Eyes no erythema of the bulbar or palpebral conjunctiva, no edema. Nose: No congestion, mucosa normal. Mouth: Throat clear, no lip or tongue edema.     Respiratory: Clear to auscultation bilaterally, no adventitious breath sounds    Skin: No rashes or lesions  Psych: Alert and oriented times 3    Impression report and plan:   Atopic dermatitis  Allergic rhinitis    Recommend Dupixent seasonally.  He can start this in the fall and continue it through the winter.  I think his allergy shots are improving his eczema.  Continue allergy shots for now.  I think okay not to carry his epinephrine devices he has been on shots for greater than 1 year without systemic reaction.  Follow yearly.            "

## 2023-09-01 NOTE — LETTER
"    9/1/2023         RE: Dylan Neal  [address] [facility] [address]        Dear Colleague,    Thank you for referring your patient, Dylan Neal, to the [facility]. Please see a copy of my visit note below.        Subjective  Dylan is a 33 year old, presenting for the following health issues:  Allergy Injection and RECHECK    HPI     Chief complaint: Follow-up Dupixent and allergy shots    History of present illness: This is a pleasant 33-year-old gentleman with a history of atopic dermatitis here today for follow-up visit.  He has not been using his Dupixent regularly and has had no breakthrough symptoms.  He does report the winter that is worse for him and he is worried that his eczema will return if he is not on Dupixent.  Overall he feels allergy shots have improved his symptoms and that this summer he is done well with his eczema and allergy symptoms.  No systemic reactions.  He started maintenance shots in April of this year.            Objective   Ht 1.778 m (5' 10\")   Wt 72.1 kg (159 lb)   BMI 22.81 kg/m    Body mass index is 22.81 kg/m .  Physical Exam      Gen: Pleasant male not in acute distress  HEENT: Eyes no erythema of the bulbar or palpebral conjunctiva, no edema. Nose: No congestion, mucosa normal. Mouth: Throat clear, no lip or tongue edema.     Respiratory: Clear to auscultation bilaterally, no adventitious breath sounds    Skin: No rashes or lesions  Psych: Alert and oriented times 3    Impression report and plan:   Atopic dermatitis  Allergic rhinitis    Recommend Dupixent seasonally.  He can start this in the fall and continue it through the winter.  I think his allergy shots are improving his eczema.  Continue allergy shots for now.  I think okay not to carry his epinephrine devices he has been on shots for greater than 1 year without systemic reaction.  Follow yearly.              Again, thank you for allowing me to participate in the " care of your patient.        Sincerely,        Rica OLGUIN MD

## 2023-09-15 ENCOUNTER — ALLIED HEALTH/NURSE VISIT (OUTPATIENT)
Dept: ALLERGY | Facility: CLINIC | Age: 33
End: 2023-09-15
Payer: COMMERCIAL

## 2023-09-15 DIAGNOSIS — J30.81 ALLERGIC RHINITIS DUE TO ANIMALS: Primary | ICD-10-CM

## 2023-09-15 PROCEDURE — 95117 IMMUNOTHERAPY INJECTIONS: CPT

## 2023-09-29 ENCOUNTER — ALLIED HEALTH/NURSE VISIT (OUTPATIENT)
Dept: ALLERGY | Facility: CLINIC | Age: 33
End: 2023-09-29
Payer: COMMERCIAL

## 2023-09-29 DIAGNOSIS — J30.81 ALLERGIC RHINITIS DUE TO ANIMALS: Primary | ICD-10-CM

## 2023-09-29 PROCEDURE — 95117 IMMUNOTHERAPY INJECTIONS: CPT

## 2023-10-13 ENCOUNTER — ALLIED HEALTH/NURSE VISIT (OUTPATIENT)
Dept: ALLERGY | Facility: CLINIC | Age: 33
End: 2023-10-13
Payer: COMMERCIAL

## 2023-10-13 DIAGNOSIS — J30.81 ALLERGIC RHINITIS DUE TO ANIMALS: Primary | ICD-10-CM

## 2023-10-13 PROCEDURE — 95117 IMMUNOTHERAPY INJECTIONS: CPT

## 2023-11-01 NOTE — TELEPHONE ENCOUNTER
Natalie Dobson is a 68 year old male here for  Chief Complaint   Patient presents with   • Lung Cancer     NSCLC   • Chemotherapy     Pembro C15D1   • Office Visit     3 week APC FU     Denies latex allergy or sensitivity.    Medication verified and med list updated.  PCP and Pharmacy verified.    Social History     Tobacco Use   Smoking Status Former   • Current packs/day: 0.00   • Average packs/day: 1 pack/day for 56.2 years (56.2 ttl pk-yrs)   • Types: Cigarettes   • Start date: 1966   • Quit date: 2022   • Years since quittin.0   Smokeless Tobacco Never     Advance Directives Filed: Yes    ECOG:   ECOG [23 1326]   ECOG Performance Status 1       Vitals:    Visit Vitals  /75 (BP Location: RUE - Right upper extremity, Patient Position: Sitting, Cuff Size: Regular)   Pulse 76   Temp 98.8 °F (37.1 °C) (Oral)   Resp 18   Wt 80.2 kg (176 lb 12.9 oz)   SpO2 99%   BMI 25.37 kg/m²       These vital signs are:      Height: No.  Ht Readings from Last 1 Encounters:   10/11/23 5' 10\" (1.778 m)     Weight:Yes, shoes on.  Wt Readings from Last 3 Encounters:   23 80.2 kg (176 lb 12.9 oz)   10/11/23 78.6 kg (173 lb 4.5 oz)   23 77.2 kg (170 lb 1.4 oz)       BMI: Body mass index is 25.37 kg/m².    REVIEW OF SYSTEMS  GENERAL:  Patient denies headache, fevers, chills, night sweats, excessive fatigue, change in appetite, weight loss, dizziness  ALLERGIC/IMMUNOLOGIC: Verified allergies: Yes  EYES:  Patient denies significant visual difficulties, double vision, blurred vision  ENT/MOUTH: Patient denies problems with hearing, sore throat, sinus drainage, mouth sores  ENDOCRINE:  Patient denies diabetes, hormone replacement, hot flashes, but complains of: thyroid disease  HEMATOLOGIC/LYMPHATIC: Patient denies easy bruising, bleeding, tender lymph nodes, swollen lymph nodes  BREASTS: Patient denies abnormal masses of breast, nipple discharge, pain  RESPIRATORY:  Patient denies lung pain with breathing,  Writer called the patient for second time. Pt answered phone. Pt was informed that we do shots Tuesdays and Fridays for now. Writer was able to schedule on 8/26/22 at 2:20 PM. Writer told pt must bring his Epi pen and be 15 minutes before to his appt. Pt agreed and had no questions.     Hernandez Marie MA     coughing up blood, but complains of: cough and shortness of breath per patient on going without change. Cough productive in mornings regularly and shortness of breath with activity.  CARDIOVASCULAR:  Patient denies anginal chest pain, palpitations, shortness of breath when lying flat, peripheral edema, but complains of: per patient chronic pain in center of chest; today pain 2 out 10  GASTROINTESTINAL: Patient denies abdominal pain , nausea, vomiting, diarrhea, GI bleeding, constipation, change in bowel habits, heartburn, sensation of feeling full, difficulty swallowing  : Patient denies blood in the urine, burning with urination, frequency, urgency, hesitancy, incontinence  MUSCULOSKELETAL:  Patient denies joint pain, bone pain, joint swelling, redness, decreased range of motion  SKIN:  Patient denies chronic rashes, inflammation, ulcerations, skin changes, itching  NEUROLOGIC:  Patient denies loss of balance, areas of focal weakness, abnormal gait, sensory problems, tingling, but complains of: numbness per patient on going in fingertips  PSYCHIATRIC: Patient denies insomnia, depression, anxiety    This patient reported abnormal symptoms that needed immediate verbal communication: Yes, these symptoms included above and were reported to provider.

## 2023-11-10 ENCOUNTER — ALLIED HEALTH/NURSE VISIT (OUTPATIENT)
Dept: ALLERGY | Facility: CLINIC | Age: 33
End: 2023-11-10
Payer: COMMERCIAL

## 2023-11-10 DIAGNOSIS — J30.81 ALLERGIC RHINITIS DUE TO ANIMALS: ICD-10-CM

## 2023-11-10 DIAGNOSIS — L30.8 OTHER ECZEMA: Primary | ICD-10-CM

## 2023-11-10 PROCEDURE — 95117 IMMUNOTHERAPY INJECTIONS: CPT

## 2023-12-11 ENCOUNTER — ALLIED HEALTH/NURSE VISIT (OUTPATIENT)
Dept: ALLERGY | Facility: CLINIC | Age: 33
End: 2023-12-11
Payer: COMMERCIAL

## 2023-12-11 DIAGNOSIS — J30.89 ALLERGIC RHINITIS CAUSED BY MOLD: ICD-10-CM

## 2023-12-11 DIAGNOSIS — J30.89 ALLERGIC RHINITIS DUE TO DUST MITE: ICD-10-CM

## 2023-12-11 DIAGNOSIS — L30.8 OTHER ECZEMA: Primary | ICD-10-CM

## 2023-12-11 DIAGNOSIS — J30.81 ALLERGIC RHINITIS DUE TO ANIMALS: ICD-10-CM

## 2023-12-11 PROCEDURE — 95117 IMMUNOTHERAPY INJECTIONS: CPT

## 2024-02-02 ENCOUNTER — ALLIED HEALTH/NURSE VISIT (OUTPATIENT)
Dept: ALLERGY | Facility: CLINIC | Age: 34
End: 2024-02-02
Payer: COMMERCIAL

## 2024-02-02 DIAGNOSIS — J30.81 ALLERGIC RHINITIS DUE TO ANIMALS: Primary | ICD-10-CM

## 2024-02-02 DIAGNOSIS — J30.89 ALLERGIC RHINITIS DUE TO DUST MITE: ICD-10-CM

## 2024-02-02 DIAGNOSIS — J30.89 ALLERGIC RHINITIS CAUSED BY MOLD: ICD-10-CM

## 2024-02-02 PROCEDURE — 95117 IMMUNOTHERAPY INJECTIONS: CPT

## 2024-02-16 ENCOUNTER — ALLIED HEALTH/NURSE VISIT (OUTPATIENT)
Dept: ALLERGY | Facility: CLINIC | Age: 34
End: 2024-02-16
Payer: COMMERCIAL

## 2024-02-16 DIAGNOSIS — J30.81 ALLERGIC RHINITIS DUE TO ANIMALS: Primary | ICD-10-CM

## 2024-02-16 DIAGNOSIS — J30.89 ALLERGIC RHINITIS CAUSED BY MOLD: ICD-10-CM

## 2024-02-16 DIAGNOSIS — J30.89 ALLERGIC RHINITIS DUE TO DUST MITE: ICD-10-CM

## 2024-02-16 PROCEDURE — 95117 IMMUNOTHERAPY INJECTIONS: CPT

## 2024-02-29 DIAGNOSIS — J30.81 ALLERGIC RHINITIS DUE TO ANIMALS: Primary | ICD-10-CM

## 2024-02-29 DIAGNOSIS — J30.89 ALLERGIC RHINITIS DUE TO DUST MITE: ICD-10-CM

## 2024-02-29 DIAGNOSIS — J30.89 ALLERGIC RHINITIS CAUSED BY MOLD: ICD-10-CM

## 2024-02-29 PROCEDURE — 95165 ANTIGEN THERAPY SERVICES: CPT | Performed by: ALLERGY & IMMUNOLOGY

## 2024-02-29 NOTE — PROGRESS NOTES
ALT/DFM/DPM/UFDOG  1:1 V/V BUD: 2/26/2025  CAT  1:1 V/V BUD: 2/26/2025    CHARGED 20 UNITS  CHECKED BY IB

## 2024-03-15 ENCOUNTER — ALLIED HEALTH/NURSE VISIT (OUTPATIENT)
Dept: ALLERGY | Facility: CLINIC | Age: 34
End: 2024-03-15
Payer: COMMERCIAL

## 2024-03-15 DIAGNOSIS — J30.89 ALLERGIC RHINITIS DUE TO DUST MITE: ICD-10-CM

## 2024-03-15 DIAGNOSIS — J30.81 ALLERGIC RHINITIS DUE TO ANIMALS: Primary | ICD-10-CM

## 2024-03-15 DIAGNOSIS — J30.89 ALLERGIC RHINITIS CAUSED BY MOLD: ICD-10-CM

## 2024-03-15 PROCEDURE — 95117 IMMUNOTHERAPY INJECTIONS: CPT

## 2024-03-29 ENCOUNTER — ALLIED HEALTH/NURSE VISIT (OUTPATIENT)
Dept: ALLERGY | Facility: CLINIC | Age: 34
End: 2024-03-29
Payer: COMMERCIAL

## 2024-03-29 DIAGNOSIS — J30.81 ALLERGIC RHINITIS DUE TO ANIMALS: Primary | ICD-10-CM

## 2024-03-29 PROCEDURE — 95117 IMMUNOTHERAPY INJECTIONS: CPT

## 2024-04-11 ENCOUNTER — TELEPHONE (OUTPATIENT)
Dept: ALLERGY | Facility: CLINIC | Age: 34
End: 2024-04-11
Payer: COMMERCIAL

## 2024-04-11 NOTE — TELEPHONE ENCOUNTER
Prior Authorization Approval    Medication: DUPILUMAB 300 MG/2ML SC SOPN  Authorization Effective Date: 3/12/2024  Authorization Expiration Date: 4/11/2025  Approved Dose/Quantity: 4 ML / 28 day  Reference #: [id number] (Key: [id number])   Insurance Company: [employer] - Phone [phone] Fax [phone]  Expected CoPay: $    CoPay Card Available:      Financial Assistance Needed:   Which Pharmacy is filling the prescription:    Pharmacy Notified:   Patient Notified:         Thank You,     [facility]  Specialty Pharmacy [facility]  [email or web address]  [email or web address]  Phone: [phone]  Fax: [phone]

## 2024-04-12 ENCOUNTER — ALLIED HEALTH/NURSE VISIT (OUTPATIENT)
Dept: ALLERGY | Facility: CLINIC | Age: 34
End: 2024-04-12
Payer: COMMERCIAL

## 2024-04-12 DIAGNOSIS — J30.81 ALLERGIC RHINITIS DUE TO ANIMALS: Primary | ICD-10-CM

## 2024-04-12 PROCEDURE — 95117 IMMUNOTHERAPY INJECTIONS: CPT

## 2024-04-12 NOTE — PROGRESS NOTES
Dylan Neal presents to clinic today at the request of Rica Ennis MD (ordering provider) for Allergy Immunotherapy injection(s).       This service provided today was under the care of Rica Ennis MD; the supervising provider of the day; who was available if needed.      Patient presented after waiting 30 minutes with no reaction to  injections. Discharged from clinic.    Paloma Crzu RN

## 2024-05-17 DIAGNOSIS — L30.8 OTHER ECZEMA: ICD-10-CM

## 2024-05-17 RX ORDER — DUPILUMAB 300 MG/2ML
INJECTION, SOLUTION SUBCUTANEOUS
Qty: 8 ML | Refills: 3 | Status: SHIPPED | OUTPATIENT
Start: 2024-05-17

## 2024-06-07 ENCOUNTER — ALLIED HEALTH/NURSE VISIT (OUTPATIENT)
Dept: ALLERGY | Facility: CLINIC | Age: 34
End: 2024-06-07
Payer: COMMERCIAL

## 2024-06-07 DIAGNOSIS — J30.81 ALLERGIC RHINITIS DUE TO ANIMALS: Primary | ICD-10-CM

## 2024-06-07 PROCEDURE — 95117 IMMUNOTHERAPY INJECTIONS: CPT

## 2024-06-21 ENCOUNTER — ALLIED HEALTH/NURSE VISIT (OUTPATIENT)
Dept: ALLERGY | Facility: CLINIC | Age: 34
End: 2024-06-21
Payer: COMMERCIAL

## 2024-06-21 DIAGNOSIS — J30.89 ALLERGIC RHINITIS DUE TO DUST MITE: Primary | ICD-10-CM

## 2024-06-21 PROCEDURE — 95117 IMMUNOTHERAPY INJECTIONS: CPT

## 2024-06-21 NOTE — PROGRESS NOTES
Dylan Neal presents to clinic today at the request of Rica Ennis MD (ordering provider) for Allergy Immunotherapy injection(s).       This service provided today was under the care of Rica Ennis MD; the supervising provider of the day; who was available if needed.      Patient presented after waiting 30 minutes with no reaction to  injections. Discharged from clinic.    Paloma Cruz RN    
No indicators present

## 2024-07-12 ENCOUNTER — ALLIED HEALTH/NURSE VISIT (OUTPATIENT)
Dept: ALLERGY | Facility: CLINIC | Age: 34
End: 2024-07-12
Payer: COMMERCIAL

## 2024-07-12 DIAGNOSIS — J30.81 ALLERGIC RHINITIS DUE TO ANIMALS: ICD-10-CM

## 2024-07-12 DIAGNOSIS — J30.89 ALLERGIC RHINITIS CAUSED BY MOLD: ICD-10-CM

## 2024-07-12 DIAGNOSIS — J30.89 ALLERGIC RHINITIS DUE TO DUST MITE: Primary | ICD-10-CM

## 2024-07-12 PROCEDURE — 95117 IMMUNOTHERAPY INJECTIONS: CPT

## 2024-07-12 NOTE — PROGRESS NOTES
Dylan Neal presents to clinic today at the request of Rica Ennis MD (ordering provider) for Allergy Immunotherapy injection(s).       This service provided today was under the care of Rica Ennis MD; the supervising provider of the day; who was available if needed.      Patient presented after waiting 30 minutes with no reaction to  injections. Discharged from clinic.    Dayanna Johnson RN

## 2024-08-09 ENCOUNTER — OFFICE VISIT (OUTPATIENT)
Dept: ALLERGY | Facility: CLINIC | Age: 34
End: 2024-08-09
Payer: COMMERCIAL

## 2024-08-09 VITALS — HEIGHT: 70 IN | RESPIRATION RATE: 16 BRPM | WEIGHT: 160 LBS | BODY MASS INDEX: 22.9 KG/M2

## 2024-08-09 DIAGNOSIS — R06.02 SHORTNESS OF BREATH: Primary | ICD-10-CM

## 2024-08-09 DIAGNOSIS — J30.89 ALLERGIC RHINITIS DUE TO DUST MITE: ICD-10-CM

## 2024-08-09 DIAGNOSIS — L20.89 OTHER ATOPIC DERMATITIS: ICD-10-CM

## 2024-08-09 DIAGNOSIS — J30.89 ALLERGIC RHINITIS CAUSED BY MOLD: ICD-10-CM

## 2024-08-09 DIAGNOSIS — J30.81 ALLERGIC RHINITIS DUE TO ANIMALS: ICD-10-CM

## 2024-08-09 LAB
FEF 25/75: NORMAL
FEV-1: NORMAL
FEV1/FVC: NORMAL
FVC: NORMAL

## 2024-08-09 PROCEDURE — 99214 OFFICE O/P EST MOD 30 MIN: CPT | Mod: 25 | Performed by: ALLERGY & IMMUNOLOGY

## 2024-08-09 PROCEDURE — 94010 BREATHING CAPACITY TEST: CPT | Performed by: ALLERGY & IMMUNOLOGY

## 2024-08-09 NOTE — Clinical Note
Another referral for likely VCD  Thanks,  Rica 77yo M with PMH UC, HTN, hypothyroid, HLD, asthma, chronic back pain, anxiety, GERD, hiatal hernia, generalized arthritis, s/p bilateral total hip replacement, s/p multiple ventral hernia repairs (1990, Dr. Goldberg, unsure if mesh was used), s/p bilateral knee arthroscopies, s/p L5-S1 laminectomy/fusion (2017), presents with 2 day history of worsening 7/10 abdominal pain, admitted for acute appendicitis. 79yo M with PMH UC, HTN, hypothyroid, HLD, asthma, chronic back pain, anxiety, GERD, hiatal hernia, generalized arthritis, s/p bilateral total hip replacement, s/p multiple ventral hernia repairs (1990, Dr. Goldberg, unsure if mesh was used), s/p bilateral knee arthroscopies, s/p L5-S1 laminectomy/fusion (2017), presents with 2 day history of worsening 7/10 abdominal pain, admitted for acute appendicitis.

## 2024-08-09 NOTE — LETTER
"8/9/2024      Dylan Neal  [address] [place] [address]      Dear Colleague,    Thank you for referring your patient, Dylan Neal, to the [facility]. Please see a copy of my visit note below.          Subjective  Dylan is a 34 year old, presenting for the following health issues:  RECHECK (Annual on allergy shots)    HPI     Chief complaint: Follow-up allergies and Dupixent    History of present illness: This is a pleasant 34-year-old gentleman here today for follow-up of allergies and Dupixent he would also like to discuss asthma.  He states that he talks all day as he has a .  He states he has noticed that he is having some difficulty taking breaths in and having breath support.  He wonders if this could be asthma.  He states he will often talk walking up stairs and he states this will cause him to be more breathless but when he works out other times without talking he has no difficulty.  He has no cough, wheeze or shortness of breath outside of talking and it does not wake him up from sleep.  He continues on Dupixent but is using it as needed 300 mg for eczema.  He states he had to use about 6 or 7 doses in the last year.  He continues on allergy shots.  He believes they are helping.  No systemic reactions.  Denies any difficulty swallowing.          Objective   Resp 16   Ht 1.778 m (5' 10\")   Wt 72.6 kg (160 lb)   BMI 22.96 kg/m    Body mass index is 22.96 kg/m .  Physical Exam   Gen: Pleasant male not in acute distress  HEENT: Eyes no erythema of the bulbar or palpebral conjunctiva, no edema. Nose: No congestion,. Mouth: Throat clear, no lip or tongue edema.   Respiratory: Clear to auscultation bilaterally, no adventitious breath sounds  Skin: No rashes or lesions  Psych: Alert and oriented times 3  Neck: No masses lesions or swelling    Spirometry was performed.  Difficult to achieve it reproducible matches as the patient had difficulty breathing out " quickly.  However, his inspiratory loop was very flattened.        Impression report and plan:  Atopic dermatitis  Allergic rhinitis  Vocal cord dysfunction  History is consistent with vocal cord dysfunction.  He does have flattening of inspiratory loop.  Referral made to the [facility] voice clinic and instruction given.  I do not think this is asthma.  He did have difficulty breathing out fast but I do not think there is a pathology causing this difficulty.  If symptoms persist could consider full pulmonary function test or imaging.  Otherwise continue allergy shots.  Notify systemic reaction.  I did inform the patient that using Dupixent intermittently may cause it to not work eventually.  Reviewed risk of eye irritation and eosinophilic granulomatous polyangitis and cutaneous T-cell lymphoma.    Time spent with patient, chart review and documentation, 30 minutes on date of service in addition to 3-minute procedure time    Signed Electronically by: MD Dylan Moncada presents to clinic today at the request of Rica Ennis MD (ordering provider) for Allergy Immunotherapy injection(s).       This service provided today was under the care of Rica Ennis MD; the supervising provider of the day; who was available if needed.      Patient presented after waiting 30 minutes with no reaction to  injections. Discharged from clinic.    Paloma Cruz, BARBARA      Again, thank you for allowing me to participate in the care of your patient.        Sincerely,        Rica ENNIS MD

## 2024-08-09 NOTE — PROGRESS NOTES
"      Subjective   Dylan is a 34 year old, presenting for the following health issues:  RECHECK (Annual on allergy shots)    HPI     Chief complaint: Follow-up allergies and Dupixent    History of present illness: This is a pleasant 34-year-old gentleman here today for follow-up of allergies and Dupixent he would also like to discuss asthma.  He states that he talks all day as he has a .  He states he has noticed that he is having some difficulty taking breaths in and having breath support.  He wonders if this could be asthma.  He states he will often talk walking up stairs and he states this will cause him to be more breathless but when he works out other times without talking he has no difficulty.  He has no cough, wheeze or shortness of breath outside of talking and it does not wake him up from sleep.  He continues on Dupixent but is using it as needed 300 mg for eczema.  He states he had to use about 6 or 7 doses in the last year.  He continues on allergy shots.  He believes they are helping.  No systemic reactions.  Denies any difficulty swallowing.          Objective    Resp 16   Ht 1.778 m (5' 10\")   Wt 72.6 kg (160 lb)   BMI 22.96 kg/m    Body mass index is 22.96 kg/m .  Physical Exam   Gen: Pleasant male not in acute distress  HEENT: Eyes no erythema of the bulbar or palpebral conjunctiva, no edema. Nose: No congestion,. Mouth: Throat clear, no lip or tongue edema.   Respiratory: Clear to auscultation bilaterally, no adventitious breath sounds  Skin: No rashes or lesions  Psych: Alert and oriented times 3  Neck: No masses lesions or swelling    Spirometry was performed.  Difficult to achieve it reproducible matches as the patient had difficulty breathing out quickly.  However, his inspiratory loop was very flattened.        Impression report and plan:  Atopic dermatitis  Allergic rhinitis  Vocal cord dysfunction  History is consistent with vocal cord dysfunction.  He does have flattening of " inspiratory loop.  Referral made to the [facility] voice clinic and instruction given.  I do not think this is asthma.  He did have difficulty breathing out fast but I do not think there is a pathology causing this difficulty.  If symptoms persist could consider full pulmonary function test or imaging.  Otherwise continue allergy shots.  Notify systemic reaction.  I did inform the patient that using Dupixent intermittently may cause it to not work eventually.  Reviewed risk of eye irritation and eosinophilic granulomatous polyangitis and cutaneous T-cell lymphoma.    Time spent with patient, chart review and documentation, 30 minutes on date of service in addition to 3-minute procedure time    Signed Electronically by: Rica OLGUIN MD

## 2024-08-09 NOTE — PATIENT INSTRUCTIONS
Vocal Cord Dysfunction    [facility]    PAUSED BREATHING:  Sit in a position that allows your neck & shoulders to relax but keep your back straight.  Breathe in gently through the nose.  Stick your tongue out of your mouth, past the teeth & lower lip, in preparation to exhale. This forward stretch of the tongue helps to open the airway at the vocal cords. This may be difficult to do with a severe spasm but will be easier the more you repeat this exercise.  With the tongue out, exhale only through the mouth in slow, paused or spaced breaths. The timing should be like saying Ha, Ha, Ha, Ha, very slowly. Don't use your voice, just breathe out.  Repeat 10 times and practice 3 times a day so you will know how to do it well when VCD occurs.  BELLY BREATHING:  Sit in a position that allows your neck and shoulders to relax but keep your back straight.  Place your hand on your belly. Breathe in gently through the nose with your belly pushing your hand outward from your body.  As you start to exhale, place the tip of your tongue where your upper teeth meet the roof of your mouth. This will allow you to make a hissing or  S  sound as you exhale. This creates a back pressure to help keep the airway open.  Slowly exhale allowing the hand & belly to move inward to a resting position and make the hissing or  S  sound as you push the air between your tongue & teeth.  Repeat 10 times & practice 3 times a day so you will know how to do it well when VCD occurs.

## 2024-08-14 ENCOUNTER — TELEPHONE (OUTPATIENT)
Dept: OTOLARYNGOLOGY | Facility: CLINIC | Age: 34
End: 2024-08-14
Payer: COMMERCIAL

## 2024-08-14 ENCOUNTER — MYC MEDICAL ADVICE (OUTPATIENT)
Dept: OTOLARYNGOLOGY | Facility: CLINIC | Age: 34
End: 2024-08-14
Payer: COMMERCIAL

## 2024-08-14 NOTE — TELEPHONE ENCOUNTER
Left Voicemail (1st Attempt) for the patient to call back and schedule the following:    Appointment type: New SLP Voice  Provider: Any Voice SLP  Return date: Patients convenience  Specialty phone number: [phone]  Additional appointment(s) needed: No  Additonal Notes: Ref by Dr. Ennis for Shortness of Breath

## 2024-08-20 ENCOUNTER — MYC MEDICAL ADVICE (OUTPATIENT)
Dept: OTOLARYNGOLOGY | Facility: CLINIC | Age: 34
End: 2024-08-20
Payer: COMMERCIAL

## 2024-08-20 NOTE — TELEPHONE ENCOUNTER
Left Voicemail (2nd Attempt) for the patient to call back and schedule the following:    Appointment type: New SLP Voice  Provider: Any Voice SLP  Return date: Patients convenience  Specialty phone number: [phone]  Additional appointment(s) needed: No  Additonal Notes: Ref by Dr. Ennis for Shortness of Breath

## 2024-08-27 NOTE — TELEPHONE ENCOUNTER
Left Voicemail (2nd Attempt) for the patient to call back and schedule the following:    Appointment type: New SLP Voice  Provider: Any Voice SLP  Return date: Patients convenience  Specialty phone number: [phone]  Additional appointment(s) needed: No  Additonal Notes: Ref by Dr. Ennis for Shortness of Breath

## 2024-09-22 ENCOUNTER — HEALTH MAINTENANCE LETTER (OUTPATIENT)
Age: 34
End: 2024-09-22

## 2024-10-04 ENCOUNTER — ALLIED HEALTH/NURSE VISIT (OUTPATIENT)
Dept: ALLERGY | Facility: CLINIC | Age: 34
End: 2024-10-04
Payer: COMMERCIAL

## 2024-10-04 DIAGNOSIS — J30.89 ALLERGIC RHINITIS CAUSED BY MOLD: ICD-10-CM

## 2024-10-04 DIAGNOSIS — J30.89 ALLERGIC RHINITIS DUE TO DUST MITE: Primary | ICD-10-CM

## 2024-10-04 DIAGNOSIS — J30.81 ALLERGIC RHINITIS DUE TO ANIMALS: ICD-10-CM

## 2024-10-04 PROCEDURE — 95117 IMMUNOTHERAPY INJECTIONS: CPT

## 2024-10-11 ENCOUNTER — ALLIED HEALTH/NURSE VISIT (OUTPATIENT)
Dept: ALLERGY | Facility: CLINIC | Age: 34
End: 2024-10-11
Payer: COMMERCIAL

## 2024-10-11 DIAGNOSIS — J30.89 ALLERGIC RHINITIS CAUSED BY MOLD: Primary | ICD-10-CM

## 2024-10-11 PROCEDURE — 95117 IMMUNOTHERAPY INJECTIONS: CPT

## 2024-11-08 ENCOUNTER — ALLIED HEALTH/NURSE VISIT (OUTPATIENT)
Dept: ALLERGY | Facility: CLINIC | Age: 34
End: 2024-11-08
Payer: COMMERCIAL

## 2024-11-08 DIAGNOSIS — J30.89 ALLERGIC RHINITIS CAUSED BY MOLD: Primary | ICD-10-CM

## 2024-11-08 PROCEDURE — 95117 IMMUNOTHERAPY INJECTIONS: CPT

## 2024-11-21 DIAGNOSIS — J30.81 ALLERGIC RHINITIS DUE TO ANIMALS: ICD-10-CM

## 2024-11-21 DIAGNOSIS — J30.89 ALLERGIC RHINITIS DUE TO DUST MITE: ICD-10-CM

## 2024-11-21 DIAGNOSIS — J30.89 ALLERGIC RHINITIS CAUSED BY MOLD: Primary | ICD-10-CM

## 2024-11-21 NOTE — PROGRESS NOTES
ALT/DFM/DPM/UFDOG  1:1V/V BUD: 11/19/2025  CAT  1:1 V/V BUD: 11/19/2025    CHECKED BY ABS  CHARGED 20 UNITS  
no numbness, no tingling

## 2025-02-12 NOTE — TELEPHONE ENCOUNTER
REFERRAL INFORMATION:  Referring By: Rica Ennis MD   Referring Clinic: [facility]   Reason for Visit/Diagnosis: Shortness of breath, vocal cord dysfunction, apt per Pt, [place] verified      FUTURE VISIT INFORMATION:  Appointment Date: 5/13/25  Appointment Time: 9 AM     NOTES STATUS DETAILS   OFFICE NOTE from referring provider Internal [facility]   8/9/24 OV- Rica Ennis MD    IMAGING *images and reports*     CT, MRI, PET, NM, US, XRAYS Internal/ CE  PACS 2/23/22 CT abd pel  4/15/2017 XR chest   12/12/15 XR chest   PULMONARY FUNCTION TESTS (PFTs) Internal 8/9/24 PFT

## 2025-05-06 DIAGNOSIS — L30.8 OTHER ECZEMA: ICD-10-CM

## 2025-05-06 RX ORDER — DUPILUMAB 300 MG/2ML
INJECTION, SOLUTION SUBCUTANEOUS
Qty: 12 ML | Refills: 0 | Status: SHIPPED | OUTPATIENT
Start: 2025-05-06

## 2025-05-13 ENCOUNTER — OFFICE VISIT (OUTPATIENT)
Dept: OTOLARYNGOLOGY | Facility: CLINIC | Age: 35
End: 2025-05-13
Attending: ALLERGY & IMMUNOLOGY
Payer: COMMERCIAL

## 2025-05-13 ENCOUNTER — PRE VISIT (OUTPATIENT)
Dept: OTOLARYNGOLOGY | Facility: CLINIC | Age: 35
End: 2025-05-13

## 2025-05-13 DIAGNOSIS — R06.02 SHORTNESS OF BREATH: ICD-10-CM

## 2025-05-13 DIAGNOSIS — J38.3 VOCAL CORD DYSFUNCTION: Primary | ICD-10-CM

## 2025-05-13 DIAGNOSIS — R49.0 DYSPHONIA: ICD-10-CM

## 2025-05-13 PROCEDURE — 92524 BEHAVRAL QUALIT ANALYS VOICE: CPT | Mod: GN | Performed by: SPEECH-LANGUAGE PATHOLOGIST

## 2025-05-13 NOTE — LETTER
"5/13/2025       RE: Dylan Neal  [address] [place] [address]     Dear Colleague,    Thank you for referring your patient, Dylan Neal, to the [facility] VOICE CLINIC [place] [facility]. Please see a copy of my visit note below.    [facility]  [facility] - Dept. of Otolaryngology   Evaluation report - IN PERSON APPOINTMENT    Clinician: JAJA Murphy.KRYSTAL, M.MIzabella (voice), M.AIzabella, CCC-SLP  Seen in conjunction with: Dr. Dang Haque  Referring physician:  Raymon  Patient: Dylan Neal  Date of Visit: 5/13/2025  # of Visits: 1  # of Therapy sessions: 0    Benefit & Certification period: BCBS [place] of [place]    HISTORY    Chief complaint: Ángel is a 35 year old presenting today for breathing difficulties associated with vocal cord dysfunction.      Salient history: He has a history significant for breathing difficulty with physical activity and while talking.    CURRENT SYMPTOMS INCLUDE  DYSPHONIA:   No concerns about voice quality, but becomes winded with extended talking.    THROAT ISSUES:   No cough, throat clearing, globus    DYSPNEA:   1+ years  Breathing:  chest elevates  not now, but feels like he has had tightness in the throat.   neck and shoulder will become flush. 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   Not sure of it is related to asthma and allergies.  Seasonal allergies, and taking OTC meds  Tends to be a \"chatty\" person   Neck and face and shoulders - will get flush and can be splotchy and red  Will need to stop and take a breath while talking  PFT: He was told that he had vocal cord dysfunction,a s it flat lined as he was breathing in.  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 \"Bull pen at home\"  Helps to talk out loud when at home to work through issues  And walking stairs to get some steps in and talking the whole time   A few weeks ago the breathing was very bad  Not sure if he will get an inspiratory stridor, but leaning toward \"yes\"  Gotten winded from sitting in his chair  Does not get winded at night or wake with breathing issues.    DYSPHAGIA: Denies issues    ADDITIONAL:  Reflux: monitoring diet. Will has eggs and semi regular.   EOE  Still eggs   No anti-reflux meds  Gluten free and a probiotic resolved his issues.   Drinks semi-regularly - will get reflux if not careful. Nexium for a long time and hated the med.      Other remarkable notes:  His son is almost 2 years old, he is 19 months, and while liban, he has added stress. It was a surprise to be parents.   Mr. Neal is looking for a new job. [employer]  (wife) [employer] style homes.  EOE - dupixant - does not regulalrly take it and it is very expensive, and will get 4 of the injectable and will do one every 2-3 weeks.   Found that he is allergic to avocado and eggs.     OTHER PERTINENT HISTORY  Past Medical History:   Diagnosis Date     Backache     Created by Conversion  Replacement Utility updated for latest IMO load     Esophageal reflux     Created by Conversion      Ureteral stone 12/12/2015     Past Surgical History:   Procedure Laterality Date     OTHER SURGICAL HISTORY      none       OBJECTIVE  PATIENT REPORTED MEASURES  Patient Supplied Answers to Dyspnea Index Questionnaire:       No data to display                Patient Supplied Answers To VHI Questionnaire      5/12/2025    11:55 AM   Voice Handicap Index (VHI-10)   My voice makes it difficult for people to hear me 1   People have difficulty understanding me in a noisy room 0   My voice difficulties restrict my personal and social life.  2   I feel left out of conversations because of my voice 0   My voice problem causes me to " "lose income 0   I feel as though I have to strain to produce voice 0   The clarity of my voice is unpredictable 2   My voice problem upsets me 2   My voice makes me feel handicapped 0   People ask, \"What's wrong with your voice?\" 0   VHI-10 7        Patient-reported       Patient Supplied Answers To CSI Questionnaire       No data to display                Patient Supplied Answers To EAT Questionnaire       No data to display                PERCEPTUAL EVALUATION (CPT 34589)  POSTURE / TENSION/ PALPATION OF THE LARYNGEAL AREA:   upper body  neck and shoulders    BREATHING:   inspirations are inadequate in volume and frequency  clavicular elevation on inspiration  shoulder and neck involvement  phonation is not coordinated with respiration    LARYNGEAL PALPATION: firm.  Base of tongue tension, as well.    VOICE:  Ángel states that today is a typical voice today, with clinician observing voice quality to be characterized as:  Roughness: Mild Intermittent  Breathiness: WNL  Strain: WNL  Pitch:  F0/Habitual/Conversational speech: informally judged as WNL  Voiced /i/ Max Phonation Time: informally judged as WNL  Voiceless /s/ max: informally judged as WNL  Pitch glide: neurologically normal    COUGH/THROAT CLEARING:  not observed today    LARYNGEAL FUNCTION STUDIES (CPT 62352)  N/a - not warranted      LARYNGEAL EXAMINATION  Procedure: Flexible endoscopy with chip-tip technology without stroboscopy, right nostril; topical anesthesia with 3% Lidocaine and 0.25% phenylephrine was applied into the nasal cavity and allowed 3 to 5 minutes for effect.  Performed by: Dr. Dang Haque  The laryngeal and pharyngeal structures were evaluated for gross appearance, mobility, function, and focal lesions / abnormalities of the associated mucosa.    \All findings were within normal limits with the exception of the following salient features:   This exam shows the following:    Posterior commissure: mild inflammation.  Secretions: " WNL    Movement:  Right Vocal Fold: Normal  Left Vocal Fold: Normal  Supraglottic hyperfunction during connected speech tasks: WNL    Glottic Closure: complete  Upper Airway: Patent    Vocal Fold Findings:  Right Vocal Fold: WNL  Left Vocal Fold: WNL           The laryngeal exam was reviewed with Mr. Neal, and I provided pertinent explanations, as well as written and oral information.      Abducted view - essentially healthy. Mild posterior commissure hypertrophy    The laryngeal exam was reviewed with Mr. Neal, and I provided pertinent explanations, as well as written and oral information.    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    Remarkable findings included:  Perceptual evaluation demonstrated:   Mild intermittent roughness.  Respiratory mechanics demonstrate clavicular breathing.  Laryngeal exam demonstrated: essentially healthy laryngeal exam.   Primary complaint of patient today included: breathing issues    Dr. Haque also recommended alginate therapy.    Therefore, we recommended a course of speech therapy to address these concerns.    STIMULABILITY: results of therapy probes during perceptual and laryngeal evaluation demonstrate improvement with coordination of respiration and phonation and use of rescue breathing strategies    RECOMMENDATIONS:   A course of speech therapy is recommended to promote reduced discomfort, effort and fatigue and help reduce poor respiratory mechanics that appear associated with EILO .  He demonstrates a Good prognosis for improvement given adherence to therapeutic recommendations.   Positive indicators: positive response to therapy probes diagnosis is known to respond to  treatment  Negative indicators: none  Research: This patient is not currently a candidate for our research study.      DURATION / FREQUENCY: 4 one-hour sessions.  A total of 6-8 sessions may be necessary.    GOALS:  Patient goal:   To improve and maintain a healthy voice quality  To understand the problem and fix it as much as possible  To have a normal and acceptable voice quality    Short-term goal(s): Within the first 4 sessions, Mr. Neal:  will be able to independently list key factors in maintenance of good vocal hygiene with 80% accuracy, and report on their use outside the therapy room.  will utilize silent inhalation with good low-respiratory engagement 75% of the time during therapy tasks with minimal clinician support  will speak into expiratory reserve volume no more than 2 time(s) during a two minute conversation.    Long-term goal(s): In 6 months, Mr. Neal will:  Report no breathing difficulties over the course of 4 weeks of typical daily activities.     This treatment plan was developed with the patient who agreed with the recommendations.    TOTAL SERVICE TIME: 60 minutes  EVALUATION OF VOICE AND RESONANCE (30478)  NO CHARGE FACILITY FEE (03306)      ELIZA Murphy, M.M., CCC-SLP  Speech-Language Pathologist  [facility] Trained Vocologist  [facility]  [email or web address]  Pronouns: she/her      *this report was created in part through the use of computerized dictation software, and though reviewed following completion, some typographic errors may persist.  If there is confusion regarding any of this notes contents, please contact me for clarification          Again, thank you for allowing me to participate in the care of your patient.      Sincerely,    Speech Language Pathologist

## 2025-05-13 NOTE — PROGRESS NOTES
"No breathing difficulties at night    Exam:  Relaxed with U-words        ***DYSPHONIA:   {REPORTS/DENIES:755619}  Patient reports the voice problem began *** (gradual/sudden) ***ago and has *** over time.  It is felt that the problem was caused by *** and is *** bothersome. More specifically, there has been ***.  There are *** vocal demands in their daily work as a ***.  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   Not sure of it is asthma and allergies?  Seasonal allergies, and taking OTC meds   Chatty   Neck and face and shoulders - will get flush and can be splotchy and red  Will need to stop and take a breath while talking  PFT - was told that he had vocal cord dysfunction. Flatlined as he was breathing in  Dr. Ennis  Son is almost 2 - 19 months, but added stress. Was a surprise to be parents.   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 [employer]  (wife) [employer] [facility] style homes.  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 \"Bull pen at home\"  Helps to talk out loud when at home to work through issues  And walking stairs to get some steps in and talking the whole time.   A few weeks ago the breathing was very bad  Thinks he has   EOE - dupixant - does not regulalrly take it and it is very expensive, and will get 4 of the injectable and will do one every 2-3 weeks.   Found that he is allergic to avocado and eggs.   Not sure if he will get an inspiratory stridor, but leaning toward " "\"yes\"  Gotten winded from sitting in his chair.     Breathing:   - chest elevates   - not now, but feels like he has had tightness in the throat.    - neck and shoulder will become flush.     More specifically, there has been ***.   Coughing / throat-clearing can be triggered by  .    ***      ***DYSPNEA: ***  {REPORTS/DENIES:706954}  Patient reports the breathing problem began *** (gradual/sudden) ***ago and has *** over time.  It is felt that the problem was caused by *** and is *** bothersome. With regard to symptoms, he notes  . In regards to exertion, his breathing problem can be triggered by  .  ***    ***DYSPHAGIA: ***  {REPORTS/DENIES:006476}  Patient reports the swallowing problem began *** (gradual/sudden) ***ago and has *** over time.  It is felt that the problem was caused by *** and is *** bothersome. More specifically, there has been *** with liquids ***, solid foods ***, pills ***.  Currently maintains a ***  diet.   ***    ***ADDITIONAL:  Reflux: monitoring diet. Will has eggs and semi regular.   EOE  Still eggs   No anti-reflux meds  Gluten free and a probiotic resolved his issues.   Drinks semi-regularly - will get reflux if not careful. Nexium for a long time and hated the med.      Other remarkable notes:  ***    Laryngeal exam demonstrated:  ***    Recommendations/ Plan:  ***    Additional assessment based discussion included:  ***    [facility] Voice [facility]  [facility] - Dept. of Otolaryngology   Evaluation report - IN PERSON APPOINTMENT    Clinician: {[facility]-ENT:189366}  ***Seen in conjunction with: {[facility]-ENT-FIRST:181785}  ***Referring physician:  Raymon  Patient: Dylan Neal  Date of Visit: 5/13/2025  # of Visits: ***  # of Therapy sessions: ***    Benefit & Certification period: ***n/a  ***    HISTORY    Chief complaint: Ángel is a 35 year old *** presenting today for evaluation of ***.      Salient history: He has a history significant for ***.    ***We " were joined by: ***  CURRENT SYMPTOMS INCLUDE  ***VOICE:   ***  The patient reports a history of dysphonia that began *** (suddenly/gradually) in the context of ***(PRECIPITATING EVENT ie COVID, URI, Life Event) (or in the absence of an obvious inciting event).    Onset: ***  Course: ***    Worsening factors: ***  Mitigating factors include: ***  Previously evaluated by: ***  *** testing was completed  *** improvement with the following treatments ***  These symptoms have a significant functional impact on the patient in light of their ***ROLE/CAREER/OTHER ADLs  ***  ***        Symptoms have been ***PROGRESSION over time.   The patient describes ***DESCRIPTION OF SYMPTOMS, ie roughness and difficulty accessing upper register).   Symptoms worsen in the context of ***EXACERBATING FACTORS.   Symptoms are improved by ***MITIGATING FACTORS.   She initially saw ***, who referred her to ***. ***PROCEDURE/TESTING was performed, suggesting ***.   ***DIAGNOSIS was suspected and the patient was subsequently referred to *** for further evaluation. Her previous treatments have included ***.   Of note, the patient ***PREVIOUS TREATMENT, which ***OUTCOME OF TREATMENT.       ***THROAT ISSUES: ***  ***  .LVCCOUGHOBSERVATION  COUGH:  ***  his cough triggers include:  ***  THROAT CLEARING:  ***  his throat clearing triggers include:  ***  GLOBUS:  ***    ***RESPIRATION: ***  ***  The patient reports a history of dyspnea that began *** (suddenly/gradually) in the context of ***(PRECIPITATING EVENT ie COVID, URI, Life Event) (or in the absence of an obvious inciting event).    Onset: ***  Course: ***    Worsening factors: ***  Mitigating factors include: ***  Previously evaluated by: ***  *** testing was completed  *** improvement with the following treatments ***  These symptoms have a significant functional impact on the patient in light of their ***ROLE/CAREER/OTHER ADLs      ***SWALLOWING: ***  ***  The patient reports a history of  dysphagia that began *** (suddenly/gradually) in the context of ***(PRECIPITATING EVENT ie COVID, URI, Life Event) (or in the absence of an obvious inciting event).    Onset: ***  Course: ***    Worsening factors: ***  Mitigating factors include: ***  Previously evaluated by: ***  *** testing was completed  *** improvement with the following treatments ***  These symptoms have a significant functional impact on the patient in light of their ***ROLE/CAREER/OTHER ADLs    ***ADDITIONAL:  ***  Reflux: ***  The patient reports a history of reflux that began *** (suddenly/gradually) in the context of ***(PRECIPITATING EVENT ie COVID, URI, Life Event) (or in the absence of an obvious inciting event).    Onset: ***  Course: ***    Worsening factors: ***  Mitigating factors include: ***  Previously evaluated by: ***  *** testing was completed  *** improvement with the following treatments ***  These symptoms have a significant functional impact on the patient in light of their ***ROLE/CAREER/OTHER ADLs      Patient denies significant {[facility] Basic Symptoms:627859}.     OTHER PERTINENT HISTORY  {HISTORY:973626630}    Past Medical History:   Diagnosis Date    Backache     Created by Conversion  Replacement Utility updated for latest IMO load    Esophageal reflux     Created by Conversion     Ureteral stone 12/12/2015     Past Surgical History:   Procedure Laterality Date    OTHER SURGICAL HISTORY      none       OBJECTIVE  PATIENT REPORTED MEASURES  ***{[facility] PATIENT REPORTED MEASURES:515504}  ***lbtesq    Patient Supplied Answers To VHI Questionnaire      5/12/2025    11:55 AM   Voice Handicap Index (VHI-10)   My voice makes it difficult for people to hear me 1   People have difficulty understanding me in a noisy room 0   My voice difficulties restrict my personal and social life.  2   I feel left out of conversations because of my voice 0   My voice problem causes me to lose income 0   I feel as though I have to strain to produce  "voice 0   The clarity of my voice is unpredictable 2   My voice problem upsets me 2   My voice makes me feel handicapped 0   People ask, \"What's wrong with your voice?\" 0   VHI-10 7        Patient-reported       Patient Supplied Answers To CSI Questionnaire       No data to display                Patient Supplied Answers To EAT Questionnaire       No data to display                    PERCEPTUAL EVALUATION (CPT 17754)  POSTURE / TENSION/ PALPATION OF THE LARYNGEAL AREA:   {Perceptual Tension:305200}    BREATHING:   {Perceptual Breathin}    LARYNGEAL PALPATION:   {Laryngeal Palpation:636300}    VOICE:  Ángel states that today is a typical voice today, with clinician observing voice quality to be characterized as:  Roughness: {[facility] VOICE SEVERITY RATINGS:023511}  Breathiness: {[facility] VOICE SEVERITY RATINGS:334698}  Strain: {[facility] VOICE SEVERITY RATINGS:701422}  ***{Other Perceptual Categories:522792}  Pitch:  F0/Habitual/Conversational speech:  {Perceptual Pitch:864117} ***informally judged as WNL  Voiced /i/ Max Phonation Time: ***informally judged as WNL  Voiceless /s/ max: ***informally judged as WNL  Pitch glide: ***neurologically normal  Maximum Phonation Time: *** seconds informally judged as WNL  ***Singing voice: ***not a ricks  Singing voice type:  Identifies as: ***  Recommended to sing:  ***  Resonance:   Conversational speech:  {Perceptual Resonance:837798}  Loudness  Conversational speech:  {Perceptual Loudness:922461}  Projected speech:  {Perceptual Loudness:080530}  Singing vs. Speech:  ***n/a  GLOBAL ASSESSMENT OF DYSPHONIA: ***/100  ***CAPE-V Overall Severity:  ***/100    COUGH/THROAT CLEARING:  ***  {coughthroatclear:034984}    LARYNGEAL FUNCTION STUDIES (CPT 78659)  ***      LARYNGEAL EXAMINATION  Procedure: ***Flexible endoscopy with chip-tip technology with*** stroboscopy, right nostril; topical anesthesia with 3% Lidocaine and 0.25% phenylephrine was applied into the nasal cavity " and allowed 3 to 5 minutes for effect.  Performed by: {[facility]-ENT:809305}  ***{PROCEDURE:137768399}   The laryngeal and pharyngeal structures were evaluated for gross appearance, mobility, function, and focal lesions / abnormalities of the associated mucosa.  ***Stroboscopy was warranted to evaluate closure, symmetry, and vibratory characteristics of the vocal folds.  All findings were within normal limits with the exception of the following salient features:   ***  This exam shows the following:    Posterior commissure: ***{MILD, MODERATE, SEVERE LOW:254203} inflammation.  Secretions: ***    Movement:  Right Vocal Fold: ***Normal  Left Vocal Fold: ***Normal  Supraglottic hyperfunction during connected speech tasks: ***    Glottic Closure: ***Normal  Upper Airway: ***Patent    Vocal Fold Findings:  Right Vocal Fold: ***  Left Vocal Fold: ***           The laryngeal exam was reviewed with Mr. Neal, and I provided pertinent              explanations, as well as written and oral information.     The addition of stroboscopy allowed evaluation of the mucosal wave:  Amplitude: right: ***mildly decreased; left: ***mildly decreased.   Symmetry: {SYMMETRY:886449484}.  Closure pattern: {CLOSURE PATTERN:763529}.   Closure plane: { ENT CLOSURE PLANE:126694522}.   Phase distribution: {PHASE DISTRIBUTION:128019}.    The laryngeal exam was reviewed with Mr. Neal, and I provided pertinent explanations, as well as written and oral information.            ***Please see Ms. {[facility]-ENT-FIRST:225446}'s report of the flexible endoscopic evaluation of swallow.    ASSESSMENT / PLAN  IMPRESSIONS: Dylan Neal is a 35 year old ***, presenting today with {[facility] E[facility]-ICD-10 CODES:738684} in the context of {[facility] ENT-ICD-10 CODES:785904}, as evidenced by evaluation the results of the *** laryngeal function studies, as well as the laryngeal exam.    Remarkable findings included:  Perceptual evaluation  "demonstrated: ***  Laryngeal exam demonstrated: ***  Primary complaint of patient today included: ***    Additional assessment based discussion included:  ***    Therefore, we recommended a course of speech therapy to address these concerns.    ***{DYSPHONIA:457887647}. Laryngeal function studies show significant ***increase in trans-glottal airflow compared to age and gender matched norms, ***increased inferred subglottal pressure, and significantly ***elevated AVQI.    STIMULABILITY: results of therapy probes during perceptual and laryngeal evaluation demonstrate improvement with {Therapy Probe Response:384939}    RECOMMENDATIONS:   {Therapy recommendations:213863}  He demonstrates a {PROGNOSIS:751255673::\"Good\"} prognosis for improvement given adherence to therapeutic recommendations.   Positive indicators: {[place] Positive Prognostic Indicators:458419}  Negative indicators: ***  Research: This patient is ***willing to be contacted about participation in research. May be eligible for *** study.    DURATION / FREQUENCY: *** one-hour sessions ***2 one-hour weekly sessions, followed by 2 one-hour biweekly sessions.  A total of 6-8 sessions may be necessary.    ***GOALS:  Patient goal:   {GOALS:009793970}    Short-term goal(s): Within the first 4 sessions, Mr. Neal:  {DWLVCGOALS:075585}    Long-term goal(s): In *** months, Mr. Neal will:  {LONG TERM GOALS:593895237}    This treatment plan was developed with the patient who agreed with the recommendations.    ***SAMEDAY THERAPY NOTE GOES HERE, USE .DWSPTXSAMEDAY***    TOTAL SERVICE TIME: *** minutes  {LOS:937145::\"NO CHARGE FACILITY FEE (00128)\"}  {SLPLOS:714789}    ELIZA Murphy, M.M., CCC-SLP  Speech-Language Pathologist  [facility] Trained Vocologist  [facility]  [email or web address]  Pronouns: she/her      *this report was created in part through the use of computerized dictation software, and though reviewed following completion, " some typographic errors may persist.  If there is confusion regarding any of this notes contents, please contact me for clarification

## 2025-06-17 ENCOUNTER — MYC MEDICAL ADVICE (OUTPATIENT)
Dept: OTOLARYNGOLOGY | Facility: CLINIC | Age: 35
End: 2025-06-17
Payer: COMMERCIAL

## 2025-07-28 DIAGNOSIS — J30.89 ALLERGIC RHINITIS CAUSED BY MOLD: ICD-10-CM

## 2025-07-28 DIAGNOSIS — J30.89 ALLERGIC RHINITIS DUE TO DUST MITE: ICD-10-CM

## 2025-07-28 DIAGNOSIS — J30.81 ALLERGIC RHINITIS DUE TO ANIMALS: Primary | ICD-10-CM

## 2025-07-28 PROCEDURE — 95165 ANTIGEN THERAPY SERVICES: CPT | Performed by: ALLERGY & IMMUNOLOGY

## 2025-07-28 NOTE — PROGRESS NOTES
VIAL CONTENT ALT/DFM/DPM/UFDOG  1:1 V/V BUD 7/22/2026    VIAL CONTENT CAT  1:1 V/V BUD 7/22/2026    CHARGED 20 UNITS / MINIMUM 10 DOSES PER VIAL  CHECKED BY JOSE Patel, [facility]